# Patient Record
Sex: FEMALE | Race: WHITE | NOT HISPANIC OR LATINO | URBAN - METROPOLITAN AREA
[De-identification: names, ages, dates, MRNs, and addresses within clinical notes are randomized per-mention and may not be internally consistent; named-entity substitution may affect disease eponyms.]

---

## 2018-03-01 ENCOUNTER — HOSPITAL ENCOUNTER (OUTPATIENT)
Dept: OUTPATIENT SERVICES | Facility: HOSPITAL | Age: 58
Discharge: HOME | End: 2018-03-01
Attending: INTERNAL MEDICINE | Admitting: INTERNAL MEDICINE

## 2018-03-08 ENCOUNTER — OFFICE VISIT (OUTPATIENT)
Dept: NUTRITION | Facility: HOSPITAL | Age: 58
End: 2018-03-08
Attending: INTERNAL MEDICINE

## 2018-03-08 DIAGNOSIS — E66.01 MORBID OBESITY (CMS/HCC): ICD-10-CM

## 2018-03-08 PROCEDURE — 99000026 HC PROTEIN BAR BX

## 2018-03-08 PROCEDURE — 99000025 HC NEW DIRECTION BAR BX

## 2018-03-29 ENCOUNTER — OFFICE VISIT (OUTPATIENT)
Dept: NUTRITION | Facility: HOSPITAL | Age: 58
End: 2018-03-29
Attending: INTERNAL MEDICINE

## 2018-03-29 DIAGNOSIS — E66.01 MORBID OBESITY (CMS/HCC): ICD-10-CM

## 2018-03-29 PROCEDURE — 99000028 HC PUDDING BX

## 2018-03-29 PROCEDURE — 99000026 HC PROTEIN BAR BX

## 2018-03-29 PROCEDURE — 99000025 HC NEW DIRECTION BAR BX

## 2018-04-02 ENCOUNTER — OFFICE VISIT (OUTPATIENT)
Dept: FAMILY MEDICINE | Facility: CLINIC | Age: 58
End: 2018-04-02
Payer: COMMERCIAL

## 2018-04-02 VITALS
BODY MASS INDEX: 29.26 KG/M2 | HEART RATE: 79 BPM | SYSTOLIC BLOOD PRESSURE: 148 MMHG | OXYGEN SATURATION: 96 % | WEIGHT: 159 LBS | RESPIRATION RATE: 18 BRPM | DIASTOLIC BLOOD PRESSURE: 70 MMHG | HEIGHT: 62 IN

## 2018-04-02 DIAGNOSIS — M53.86 SCIATICA ASSOCIATED WITH DISORDER OF LUMBAR SPINE: ICD-10-CM

## 2018-04-02 DIAGNOSIS — M79.661 RIGHT CALF PAIN: Primary | ICD-10-CM

## 2018-04-02 PROBLEM — E53.8 DISORDER OF VITAMIN B12: Status: ACTIVE | Noted: 2017-08-30

## 2018-04-02 PROBLEM — R41.840 ATTENTION DISTURBANCE: Status: ACTIVE | Noted: 2017-03-30

## 2018-04-02 PROBLEM — E78.5 HYPERLIPIDEMIA: Status: ACTIVE | Noted: 2017-03-30

## 2018-04-02 PROBLEM — E66.9 OBESITY: Status: ACTIVE | Noted: 2017-03-30

## 2018-04-02 PROCEDURE — 99214 OFFICE O/P EST MOD 30 MIN: CPT | Performed by: INTERNAL MEDICINE

## 2018-04-02 RX ORDER — AZELASTINE HYDROCHLORIDE, FLUTICASONE PROPIONATE 137; 50 UG/1; UG/1
SPRAY, METERED NASAL
COMMUNITY
Start: 2017-09-18 | End: 2018-04-26 | Stop reason: ALTCHOICE

## 2018-04-02 RX ORDER — FLUOXETINE HYDROCHLORIDE 40 MG/1
80 CAPSULE ORAL DAILY
COMMUNITY
Start: 2017-03-30

## 2018-04-02 RX ORDER — METHYLPREDNISOLONE 4 MG
500 TABLET, DOSE PACK ORAL
COMMUNITY
Start: 2017-03-30 | End: 2020-12-01

## 2018-04-02 RX ORDER — EPINEPHRINE 0.3 MG/.3ML
INJECTION SUBCUTANEOUS
COMMUNITY
Start: 2017-04-24 | End: 2018-07-19 | Stop reason: SDUPTHER

## 2018-04-02 RX ORDER — GLUCOSAM/CHONDRO/HERB 149/HYAL 750-100 MG
TABLET ORAL
COMMUNITY
Start: 2017-03-30 | End: 2020-12-01

## 2018-04-02 RX ORDER — ERGOCALCIFEROL 1.25 MG/1
CAPSULE ORAL
COMMUNITY
Start: 2017-03-30 | End: 2020-12-01

## 2018-04-02 RX ORDER — LITHIUM CARBONATE 300 MG
300 TABLET ORAL NIGHTLY
COMMUNITY
Start: 2017-03-30

## 2018-04-02 RX ORDER — LISINOPRIL 10 MG/1
10 TABLET ORAL
COMMUNITY
Start: 2018-01-09 | End: 2018-07-19 | Stop reason: SDUPTHER

## 2018-04-02 RX ORDER — LISDEXAMFETAMINE DIMESYLATE 30 MG/1
30 CAPSULE ORAL
COMMUNITY
Start: 2014-02-18 | End: 2018-07-19 | Stop reason: DRUGHIGH

## 2018-04-03 ASSESSMENT — ENCOUNTER SYMPTOMS
ARTHRALGIAS: 1
PALPITATIONS: 0

## 2018-04-03 NOTE — PROGRESS NOTES
"  Subjective     Patient ID: Diana Walls is a 57 y.o. female with moderate to severe right sided calf pain for several days   HPI    Review of Systems   Cardiovascular: Negative for chest pain, palpitations and leg swelling.   Musculoskeletal: Positive for arthralgias.       Objective     Vitals:    04/02/18 1607   BP: (!) 148/70   Patient Position: Sitting   Pulse: 79   Resp: 18   SpO2: 96%   Weight: 72.1 kg (159 lb)   Height: 1.575 m (5' 2\")     Body mass index is 29.08 kg/m².    Physical Exam   Musculoskeletal: Normal range of motion. She exhibits tenderness. She exhibits no edema.       Assessment/Plan   Problem List Items Addressed This Visit     None      Visit Diagnoses     Right calf pain    -  Primary    will send her for Doppler US due to concern about her recent travel and persistent leg pain     Relevant Orders    Ultrasound venous leg right    Sciatica associated with disorder of lumbar spine        she will be going for physical therapy             "

## 2018-04-05 ENCOUNTER — OFFICE VISIT (OUTPATIENT)
Dept: NUTRITION | Facility: HOSPITAL | Age: 58
End: 2018-04-05
Attending: INTERNAL MEDICINE

## 2018-04-05 DIAGNOSIS — E66.01 MORBID OBESITY (CMS/HCC): ICD-10-CM

## 2018-04-05 PROCEDURE — 99000026 HC PROTEIN BAR BX: Performed by: DIETITIAN, REGISTERED

## 2018-04-05 PROCEDURE — 99000019 HC COLD BEVERAGE BX: Performed by: DIETITIAN, REGISTERED

## 2018-04-05 PROCEDURE — 99000025 HC NEW DIRECTION BAR BX: Performed by: DIETITIAN, REGISTERED

## 2018-04-12 ENCOUNTER — OFFICE VISIT (OUTPATIENT)
Dept: NUTRITION | Facility: HOSPITAL | Age: 58
End: 2018-04-12
Attending: INTERNAL MEDICINE

## 2018-04-12 DIAGNOSIS — E66.01 MORBID OBESITY (CMS/HCC): ICD-10-CM

## 2018-04-12 PROCEDURE — 99000019 HC COLD BEVERAGE BX

## 2018-04-12 PROCEDURE — 99000025 HC NEW DIRECTION BAR BX

## 2018-04-12 PROCEDURE — 99000026 HC PROTEIN BAR BX

## 2018-04-13 RX ORDER — FLUTICASONE PROPIONATE AND SALMETEROL 250; 50 UG/1; UG/1
1 POWDER RESPIRATORY (INHALATION) 2 TIMES DAILY
Qty: 1 EACH | Refills: 2 | Status: SHIPPED | OUTPATIENT
Start: 2018-04-13 | End: 2018-09-27 | Stop reason: SDUPTHER

## 2018-04-19 ENCOUNTER — OFFICE VISIT (OUTPATIENT)
Dept: NUTRITION | Facility: HOSPITAL | Age: 58
End: 2018-04-19
Attending: INTERNAL MEDICINE

## 2018-04-19 DIAGNOSIS — E66.01 MORBID OBESITY (CMS/HCC): ICD-10-CM

## 2018-04-19 PROCEDURE — 99000026 HC PROTEIN BAR BX: Performed by: DIETITIAN, REGISTERED

## 2018-04-19 PROCEDURE — 99000019 HC COLD BEVERAGE BX: Performed by: DIETITIAN, REGISTERED

## 2018-04-19 PROCEDURE — 99000025 HC NEW DIRECTION BAR BX: Performed by: DIETITIAN, REGISTERED

## 2018-04-26 ENCOUNTER — HOSPITAL ENCOUNTER (EMERGENCY)
Facility: HOSPITAL | Age: 58
Discharge: HOME | End: 2018-04-26
Attending: EMERGENCY MEDICINE
Payer: COMMERCIAL

## 2018-04-26 ENCOUNTER — APPOINTMENT (EMERGENCY)
Dept: RADIOLOGY | Facility: HOSPITAL | Age: 58
End: 2018-04-26
Attending: EMERGENCY MEDICINE
Payer: COMMERCIAL

## 2018-04-26 VITALS
BODY MASS INDEX: 30.49 KG/M2 | WEIGHT: 183 LBS | RESPIRATION RATE: 16 BRPM | SYSTOLIC BLOOD PRESSURE: 127 MMHG | HEIGHT: 65 IN | HEART RATE: 70 BPM | OXYGEN SATURATION: 95 % | TEMPERATURE: 99.1 F | DIASTOLIC BLOOD PRESSURE: 62 MMHG

## 2018-04-26 DIAGNOSIS — K59.00 CONSTIPATION, UNSPECIFIED CONSTIPATION TYPE: ICD-10-CM

## 2018-04-26 DIAGNOSIS — R10.9 ABDOMINAL PAIN, UNSPECIFIED ABDOMINAL LOCATION: Primary | ICD-10-CM

## 2018-04-26 DIAGNOSIS — M48.54XA COMPRESSION FRACTURE OF THORACIC VERTEBRA, NON-TRAUMATIC, INITIAL ENCOUNTER (CMS/HCC): ICD-10-CM

## 2018-04-26 LAB
ALBUMIN SERPL-MCNC: 4.1 G/DL (ref 3.4–5)
ALP SERPL-CCNC: 74 IU/L (ref 35–126)
ALT SERPL-CCNC: 21 IU/L (ref 11–54)
ANION GAP SERPL CALC-SCNC: 10 MEQ/L (ref 3–15)
AST SERPL-CCNC: 21 IU/L (ref 15–41)
BASOPHILS # BLD: 0.03 K/UL (ref 0.01–0.1)
BASOPHILS NFR BLD: 0.3 %
BILIRUB SERPL-MCNC: 0.7 MG/DL (ref 0.3–1.2)
BUN SERPL-MCNC: 25 MG/DL (ref 8–20)
CALCIUM SERPL-MCNC: 9.4 MG/DL (ref 8.9–10.3)
CHLORIDE SERPL-SCNC: 100 MMOL/L (ref 98–109)
CO2 SERPL-SCNC: 25 MMOL/L (ref 22–32)
CREAT SERPL-MCNC: 0.6 MG/DL (ref 0.6–1.1)
D DIMER PPP IA.FEU-MCNC: 0.35 ÆG/ML (ref 0–0.5)
DIFFERENTIAL METHOD BLD: ABNORMAL
EOSINOPHIL # BLD: 0.06 K/UL (ref 0.04–0.36)
EOSINOPHIL NFR BLD: 0.7 %
ERYTHROCYTE [DISTWIDTH] IN BLOOD BY AUTOMATED COUNT: 13.1 % (ref 11.7–14.4)
GFR SERPL CREATININE-BSD FRML MDRD: >60 ML/MIN/1.73M*2
GLUCOSE SERPL-MCNC: 104 MG/DL (ref 70–99)
HCT VFR BLDCO AUTO: 40.4 % (ref 35–45)
HGB BLD-MCNC: 13.4 G/DL (ref 11.8–15.7)
IMM GRANULOCYTES # BLD AUTO: 0.02 K/UL (ref 0–0.08)
IMM GRANULOCYTES NFR BLD AUTO: 0.2 %
LIPASE SERPL-CCNC: 33 U/L (ref 20–51)
LITHIUM SERPL-SCNC: 0.2 MMOL/L (ref 0.5–1)
LYMPHOCYTES # BLD: 1.03 K/UL (ref 1.2–3.5)
LYMPHOCYTES NFR BLD: 11.5 %
MCH RBC QN AUTO: 29.7 PG (ref 28–33.2)
MCHC RBC AUTO-ENTMCNC: 33.2 G/DL (ref 32.2–35.5)
MCV RBC AUTO: 89.6 FL (ref 83–98)
MONOCYTES # BLD: 0.58 K/UL (ref 0.28–0.8)
MONOCYTES NFR BLD: 6.5 %
NEUTROPHILS # BLD: 7.21 K/UL (ref 1.7–7)
NEUTS SEG NFR BLD: 80.8 %
NRBC BLD-RTO: 0 %
PDW BLD AUTO: 11.2 FL (ref 9.4–12.3)
PLATELET # BLD AUTO: 250 K/UL (ref 150–369)
POTASSIUM SERPL-SCNC: 3.8 MMOL/L (ref 3.6–5.1)
PROT SERPL-MCNC: 7.9 G/DL (ref 6–8.2)
RBC # BLD AUTO: 4.51 M/UL (ref 3.93–5.22)
SODIUM SERPL-SCNC: 135 MMOL/L (ref 136–144)
TROPONIN I SERPL-MCNC: <0.02 NG/ML
WBC # BLD AUTO: 8.93 K/UL (ref 3.8–10.5)

## 2018-04-26 PROCEDURE — 63600000 HC DRUGS/DETAIL CODE: Performed by: EMERGENCY MEDICINE

## 2018-04-26 PROCEDURE — 96374 THER/PROPH/DIAG INJ IV PUSH: CPT

## 2018-04-26 PROCEDURE — 80178 ASSAY OF LITHIUM: CPT | Performed by: EMERGENCY MEDICINE

## 2018-04-26 PROCEDURE — 96361 HYDRATE IV INFUSION ADD-ON: CPT

## 2018-04-26 PROCEDURE — 76775 US EXAM ABDO BACK WALL LIM: CPT

## 2018-04-26 PROCEDURE — 36415 COLL VENOUS BLD VENIPUNCTURE: CPT | Performed by: EMERGENCY MEDICINE

## 2018-04-26 PROCEDURE — 93971 EXTREMITY STUDY: CPT | Mod: RT

## 2018-04-26 PROCEDURE — 93005 ELECTROCARDIOGRAM TRACING: CPT | Performed by: EMERGENCY MEDICINE

## 2018-04-26 PROCEDURE — 85379 FIBRIN DEGRADATION QUANT: CPT | Performed by: EMERGENCY MEDICINE

## 2018-04-26 PROCEDURE — 25800000 HC PHARMACY IV SOLUTIONS: Performed by: EMERGENCY MEDICINE

## 2018-04-26 PROCEDURE — 74176 CT ABD & PELVIS W/O CONTRAST: CPT

## 2018-04-26 PROCEDURE — 80053 COMPREHEN METABOLIC PANEL: CPT | Performed by: EMERGENCY MEDICINE

## 2018-04-26 PROCEDURE — 83690 ASSAY OF LIPASE: CPT | Performed by: EMERGENCY MEDICINE

## 2018-04-26 PROCEDURE — 71045 X-RAY EXAM CHEST 1 VIEW: CPT

## 2018-04-26 PROCEDURE — 63700000 HC SELF-ADMINISTRABLE DRUG: Performed by: EMERGENCY MEDICINE

## 2018-04-26 PROCEDURE — 84484 ASSAY OF TROPONIN QUANT: CPT | Performed by: EMERGENCY MEDICINE

## 2018-04-26 PROCEDURE — 3E033NZ INTRODUCTION OF ANALGESICS, HYPNOTICS, SEDATIVES INTO PERIPHERAL VEIN, PERCUTANEOUS APPROACH: ICD-10-PCS | Performed by: EMERGENCY MEDICINE

## 2018-04-26 PROCEDURE — 3E0337Z INTRODUCTION OF ELECTROLYTIC AND WATER BALANCE SUBSTANCE INTO PERIPHERAL VEIN, PERCUTANEOUS APPROACH: ICD-10-PCS | Performed by: EMERGENCY MEDICINE

## 2018-04-26 PROCEDURE — 85025 COMPLETE CBC W/AUTO DIFF WBC: CPT | Performed by: EMERGENCY MEDICINE

## 2018-04-26 PROCEDURE — 76705 ECHO EXAM OF ABDOMEN: CPT

## 2018-04-26 PROCEDURE — 99285 EMERGENCY DEPT VISIT HI MDM: CPT | Mod: 25

## 2018-04-26 RX ORDER — HYDROMORPHONE HYDROCHLORIDE 1 MG/ML
0.5 INJECTION, SOLUTION INTRAMUSCULAR; INTRAVENOUS; SUBCUTANEOUS ONCE
Status: COMPLETED | OUTPATIENT
Start: 2018-04-26 | End: 2018-04-26

## 2018-04-26 RX ORDER — ACETAMINOPHEN 325 MG/1
650 TABLET ORAL ONCE
Status: COMPLETED | OUTPATIENT
Start: 2018-04-26 | End: 2018-04-26

## 2018-04-26 RX ADMIN — SODIUM CHLORIDE 500 ML: 9 INJECTION, SOLUTION INTRAVENOUS at 17:49

## 2018-04-26 RX ADMIN — HYDROMORPHONE HYDROCHLORIDE 0.5 MG: 1 INJECTION, SOLUTION INTRAMUSCULAR; INTRAVENOUS; SUBCUTANEOUS at 19:09

## 2018-04-26 RX ADMIN — ACETAMINOPHEN 650 MG: 325 TABLET, FILM COATED ORAL at 18:06

## 2018-04-26 ASSESSMENT — ENCOUNTER SYMPTOMS
ABDOMINAL PAIN: 1
FLANK PAIN: 1
BLOOD IN STOOL: 0
SHORTNESS OF BREATH: 0
NAUSEA: 0
VOMITING: 0
DYSURIA: 0
FREQUENCY: 0
HEMATURIA: 0

## 2018-04-26 NOTE — ED PROVIDER NOTES
"HPI     Chief Complaint   Patient presents with   • Abdominal Pain       57 female with several episodes of post-prandial rt upper abd pain with radiation to rt flank without nausea nor vomiting during the past week.  Denies any urinary sx, diarrhea, melena, or bloody stools.  Has had hx of rt sided sciatica but has noticed worsening rt calf pain since travelling to [place].        Abdominal Pain   Associated symptoms: no chest pain, no dysuria, no hematuria, no nausea, no shortness of breath and no vomiting         Patient History     Past Medical History:   Diagnosis Date   • Depression    • HTN (hypertension) asthma       History reviewed. No pertinent surgical history.    History reviewed. No pertinent family history.    Social History   Substance Use Topics   • Smoking status: Never Smoker   • Smokeless tobacco: Never Used   • Alcohol use No       Systems Reviewed from Nursing Triage:          Review of Systems     Review of Systems   Respiratory: Negative for shortness of breath.    Cardiovascular: Negative for chest pain.   Gastrointestinal: Positive for abdominal pain. Negative for blood in stool, nausea and vomiting.   Genitourinary: Positive for flank pain. Negative for dysuria, frequency and hematuria.   Musculoskeletal:        Right posterior leg pain   All other systems reviewed and are negative.       Physical Exam     ED Triage Vitals [04/26/18 1535]   Temp Heart Rate Resp BP SpO2   37.3 °C (99.1 °F) 92 18 (!) 161/77 98 %      Temp Source Heart Rate Source Patient Position BP Location FiO2 (%) (Set)   Tympanic -- Sitting Left upper arm --                     Patient Vitals for the past 24 hrs:   BP Temp Temp src Pulse Resp SpO2 Height Weight   04/26/18 1535 (!) 161/77 37.3 °C (99.1 °F) Tympanic 92 18 98 % 1.651 m (5' 5\") 83 kg (183 lb)           Physical Exam   Constitutional: She is oriented to person, place, and time. She appears well-developed and well-nourished.   HENT:   Head: Atraumatic. "   Mouth/Throat: Oropharynx is clear and moist.   Eyes: Conjunctivae and EOM are normal. Pupils are equal, round, and reactive to light.   Neck: Neck supple.   Cardiovascular: Normal rate, regular rhythm, normal heart sounds and intact distal pulses.    Pulmonary/Chest: Effort normal and breath sounds normal.   Abdominal: Soft. She exhibits no distension and no mass. There is tenderness (mild ruq without hepatomegaly nor guarding). There is no rebound and no guarding.   Musculoskeletal: Normal range of motion. She exhibits tenderness (mild posterior rt calf). She exhibits no edema.   Neurological: She is alert and oriented to person, place, and time.   Skin: Skin is warm and dry.   Psychiatric: She has a normal mood and affect. Her behavior is normal.   Nursing note and vitals reviewed.           Procedures    ED Course & MDM     Labs Reviewed   CBC AND DIFFERENTIAL    Narrative:     The following orders were created for panel order CBC and Differential.  Procedure                               Abnormality         Status                     ---------                               -----------         ------                     CBC[[id number]]                                                                          Diff Count[[id number]]                                                                     Please view results for these tests on the individual orders.   COMPREHENSIVE METABOLIC PANEL   LIPASE   TROPONIN I   URINALYSIS REFLEX CULTURE    Narrative:     The following orders were created for panel order Urinalysis with Reflex Culture.  Procedure                               Abnormality         Status                     ---------                               -----------         ------                     UA Reflex to Culture (Mac...[[id number]]                                                   Please view results for these tests on the individual orders.   D-DIMER   CBC   DIFF COUNT   UA REFLEX CULTURE  (MACROSCOPIC)       ECG 12 lead    (Results Pending)   X-RAY CHEST 1 VIEW    (Results Pending)   ULTRASOUND ABDOMEN LIMITED    (Results Pending)   Ultrasound venous leg right    (Results Pending)   ULTRASOUND KIDNEYS    (Results Pending)           [facility]         ED Course as of Apr 26 2034   Thu Apr 26, 2018   1603 Will provide ivf, monitoing, small dose of analgesics, obtain cxr, US  [HL]   1707 IMPRESSION: No acute cardiopulmonary disease.  No free air underneath the  hemidiaphragms. X-RAY CHEST 1 VIEW [HL]   1729 IMPRESSION:    1.  No evidence for cholelithiasis, acute cholecystitis,  or biliary ductal  dilatation.  2.  Mild left hydronephrosis.  3.  No right hydronephrosis.      I certify that I have reviewed this examination and agree with this report.  TRACEY Alvarado Jr.O. ULTRASOUND ABDOMEN LIMITED [HL]   1730 IMPRESSION:  No evidence for right - sided deep venous thrombosis.    I certify that I have reviewed this examination and agree with this report.  TRACEY Alvarado Jr.O. Ultrasound venous leg right [HL]   1923 IMPRESSION:  1. No obstructive uropathy. No hydronephrosis. No renal, ureteral, or urinary  bladder calculi.  2. Significant amount of stool throughout the colon.  Clinically correlate for  constipation.  3. Age-indeterminate T12 wedge compression deformity.  4. Additional findings as described above. CT ABDOMEN PELVIS WITHOUT IV CONTRAST [HL]   1944 Results are related to patient.  Patient is not able to give a urine sample.  Her symptoms consistent with urinary tract infection.  Advised patient to follow-up with PCP Dr. Kitchen.  [HL]      ED Course User Index  [HL] Seamus Amanda MD         Clinical Impressions as of Apr 26 2034   Abdominal pain, unspecified abdominal location   Constipation, unspecified constipation type   Compression fracture of thoracic vertebra, non-traumatic, initial encounter (CMS/[facility]) ([facility])     Disposition:       Seamus Amanda MD  04/26/18 2034

## 2018-04-27 LAB
ATRIAL RATE: 87
P AXIS: 49
PR INTERVAL: 164
QRS DURATION: 86
QT INTERVAL: 358
QTC CALCULATION(BAZETT): 430
R AXIS: 61
T WAVE AXIS: 60
VENTRICULAR RATE: 87

## 2018-05-03 ENCOUNTER — OFFICE VISIT (OUTPATIENT)
Dept: NUTRITION | Facility: HOSPITAL | Age: 58
End: 2018-05-03
Attending: INTERNAL MEDICINE

## 2018-05-03 DIAGNOSIS — E66.01 MORBID OBESITY (CMS/HCC): ICD-10-CM

## 2018-05-03 PROCEDURE — 99000019 HC COLD BEVERAGE BX

## 2018-05-03 PROCEDURE — 99000025 HC NEW DIRECTION BAR BX

## 2018-05-03 PROCEDURE — 99000028 HC PUDDING BX

## 2018-05-04 ENCOUNTER — TRANSCRIBE ORDERS (OUTPATIENT)
Dept: SCHEDULING | Age: 58
End: 2018-05-04

## 2018-05-04 DIAGNOSIS — R10.84 ABDOMINAL PAIN, GENERALIZED: Primary | ICD-10-CM

## 2018-05-10 ENCOUNTER — OFFICE VISIT (OUTPATIENT)
Dept: NUTRITION | Facility: HOSPITAL | Age: 58
End: 2018-05-10
Attending: INTERNAL MEDICINE

## 2018-05-10 DIAGNOSIS — E66.01 MORBID OBESITY (CMS/HCC): ICD-10-CM

## 2018-05-10 PROCEDURE — 99000026 HC PROTEIN BAR BX: Performed by: DIETITIAN, REGISTERED

## 2018-05-10 PROCEDURE — 99000025 HC NEW DIRECTION BAR BX: Performed by: DIETITIAN, REGISTERED

## 2018-05-24 ENCOUNTER — OFFICE VISIT (OUTPATIENT)
Dept: NUTRITION | Facility: HOSPITAL | Age: 58
End: 2018-05-24
Attending: INTERNAL MEDICINE

## 2018-05-24 DIAGNOSIS — E66.01 MORBID OBESITY (CMS/HCC): ICD-10-CM

## 2018-05-24 PROCEDURE — 99000028 HC PUDDING BX

## 2018-05-24 PROCEDURE — 99000025 HC NEW DIRECTION BAR BX

## 2018-05-24 PROCEDURE — 99000026 HC PROTEIN BAR BX

## 2018-05-31 ENCOUNTER — OFFICE VISIT (OUTPATIENT)
Dept: NUTRITION | Facility: HOSPITAL | Age: 58
End: 2018-05-31
Attending: INTERNAL MEDICINE

## 2018-05-31 DIAGNOSIS — E66.01 MORBID OBESITY (CMS/HCC): ICD-10-CM

## 2018-05-31 PROCEDURE — 99000028 HC PUDDING BX: Performed by: DIETITIAN, REGISTERED

## 2018-05-31 PROCEDURE — 99000026 HC PROTEIN BAR BX: Performed by: DIETITIAN, REGISTERED

## 2018-06-07 ENCOUNTER — OFFICE VISIT (OUTPATIENT)
Dept: NUTRITION | Facility: HOSPITAL | Age: 58
End: 2018-06-07
Attending: INTERNAL MEDICINE

## 2018-06-07 DIAGNOSIS — E66.01 MORBID OBESITY (CMS/HCC): ICD-10-CM

## 2018-06-07 PROCEDURE — 99000026 HC PROTEIN BAR BX

## 2018-06-07 PROCEDURE — 99000025 HC NEW DIRECTION BAR BX

## 2018-06-21 ENCOUNTER — OFFICE VISIT (OUTPATIENT)
Dept: NUTRITION | Facility: HOSPITAL | Age: 58
End: 2018-06-21
Attending: INTERNAL MEDICINE

## 2018-06-21 DIAGNOSIS — E66.01 MORBID OBESITY (CMS/HCC): ICD-10-CM

## 2018-06-21 PROCEDURE — 99000028 HC PUDDING BX

## 2018-06-21 PROCEDURE — 99000025 HC NEW DIRECTION BAR BX

## 2018-06-21 PROCEDURE — 99000026 HC PROTEIN BAR BX

## 2018-06-29 ENCOUNTER — TELEPHONE (OUTPATIENT)
Dept: FAMILY MEDICINE | Facility: CLINIC | Age: 58
End: 2018-06-29

## 2018-06-29 ENCOUNTER — TRANSCRIBE ORDERS (OUTPATIENT)
Dept: SCHEDULING | Age: 58
End: 2018-06-29

## 2018-06-29 DIAGNOSIS — Z12.39 BREAST CANCER SCREENING: ICD-10-CM

## 2018-06-29 DIAGNOSIS — Z00.00 ANNUAL PHYSICAL EXAM: Primary | ICD-10-CM

## 2018-06-29 DIAGNOSIS — Z12.31 ENCOUNTER FOR SCREENING MAMMOGRAM FOR MALIGNANT NEOPLASM OF BREAST: Primary | ICD-10-CM

## 2018-06-29 NOTE — TELEPHONE ENCOUNTER
2 THINGS    Patient is requesting a new script for an annual mammo screening(i was going to print it from when it was ordered in next gen but the patient is requesting a new electronic one.)    Also, She is scheduled July 9th and would like Labs ordered to [facility].    Please let me know once complete and I will inform the patient,

## 2018-07-05 ENCOUNTER — OFFICE VISIT (OUTPATIENT)
Dept: NUTRITION | Facility: HOSPITAL | Age: 58
End: 2018-07-05
Attending: INTERNAL MEDICINE

## 2018-07-05 DIAGNOSIS — E66.01 MORBID OBESITY (CMS/HCC): ICD-10-CM

## 2018-07-05 PROCEDURE — 99000028 HC PUDDING BX

## 2018-07-05 PROCEDURE — 99000026 HC PROTEIN BAR BX

## 2018-07-05 PROCEDURE — 99000025 HC NEW DIRECTION BAR BX

## 2018-07-09 ENCOUNTER — HOSPITAL ENCOUNTER (OUTPATIENT)
Dept: RADIOLOGY | Age: 58
Discharge: HOME | End: 2018-07-09
Attending: INTERNAL MEDICINE
Payer: COMMERCIAL

## 2018-07-09 DIAGNOSIS — Z12.31 ENCOUNTER FOR SCREENING MAMMOGRAM FOR MALIGNANT NEOPLASM OF BREAST: ICD-10-CM

## 2018-07-09 PROCEDURE — 77063 BREAST TOMOSYNTHESIS BI: CPT

## 2018-07-19 ENCOUNTER — OFFICE VISIT (OUTPATIENT)
Dept: FAMILY MEDICINE | Facility: CLINIC | Age: 58
End: 2018-07-19
Payer: COMMERCIAL

## 2018-07-19 ENCOUNTER — OFFICE VISIT (OUTPATIENT)
Dept: NUTRITION | Facility: HOSPITAL | Age: 58
End: 2018-07-19
Attending: INTERNAL MEDICINE

## 2018-07-19 VITALS
WEIGHT: 184 LBS | BODY MASS INDEX: 31.41 KG/M2 | SYSTOLIC BLOOD PRESSURE: 136 MMHG | TEMPERATURE: 97.4 F | HEART RATE: 71 BPM | OXYGEN SATURATION: 98 % | HEIGHT: 64 IN | DIASTOLIC BLOOD PRESSURE: 78 MMHG

## 2018-07-19 DIAGNOSIS — Z00.00 ENCOUNTER FOR GENERAL ADULT MEDICAL EXAMINATION WITHOUT ABNORMAL FINDINGS: Primary | ICD-10-CM

## 2018-07-19 DIAGNOSIS — R41.840 ATTENTION DISTURBANCE: ICD-10-CM

## 2018-07-19 DIAGNOSIS — E53.8 DISORDER OF VITAMIN B12: ICD-10-CM

## 2018-07-19 DIAGNOSIS — E78.2 MIXED HYPERLIPIDEMIA: ICD-10-CM

## 2018-07-19 DIAGNOSIS — E66.01 MORBID OBESITY (CMS/HCC): ICD-10-CM

## 2018-07-19 DIAGNOSIS — Z01.419 ENCNTR FOR GYN EXAM (GENERAL) (ROUTINE) W/O ABN FINDINGS: ICD-10-CM

## 2018-07-19 DIAGNOSIS — I10 ESSENTIAL HYPERTENSION: ICD-10-CM

## 2018-07-19 DIAGNOSIS — R73.09 ELEVATED GLUCOSE: ICD-10-CM

## 2018-07-19 DIAGNOSIS — F32.A DEPRESSIVE DISORDER: ICD-10-CM

## 2018-07-19 PROBLEM — N95.2 ATROPHIC VAGINITIS: Status: ACTIVE | Noted: 2018-07-19

## 2018-07-19 PROCEDURE — 99396 PREV VISIT EST AGE 40-64: CPT | Mod: 25 | Performed by: INTERNAL MEDICINE

## 2018-07-19 PROCEDURE — 200200 PR NO CHARGE: Performed by: INTERNAL MEDICINE

## 2018-07-19 PROCEDURE — 99000025 HC NEW DIRECTION BAR BX

## 2018-07-19 PROCEDURE — 99000026 HC PROTEIN BAR BX

## 2018-07-19 PROCEDURE — 90632 HEPA VACCINE ADULT IM: CPT | Performed by: INTERNAL MEDICINE

## 2018-07-19 PROCEDURE — 99000028 HC PUDDING BX

## 2018-07-19 PROCEDURE — 90471 IMMUNIZATION ADMIN: CPT | Performed by: INTERNAL MEDICINE

## 2018-07-19 RX ORDER — LISINOPRIL 10 MG/1
10 TABLET ORAL DAILY
Qty: 90 TABLET | Refills: 3 | Status: SHIPPED | OUTPATIENT
Start: 2018-07-19 | End: 2018-12-20 | Stop reason: SDUPTHER

## 2018-07-19 RX ORDER — LISDEXAMFETAMINE DIMESYLATE 40 MG/1
40 CAPSULE ORAL EVERY MORNING
Refills: 0 | COMMUNITY
Start: 2018-05-25

## 2018-07-19 RX ORDER — EPINEPHRINE 0.3 MG/.3ML
1 INJECTION SUBCUTANEOUS ONCE
Qty: 1 EACH | Refills: 5 | Status: SHIPPED | OUTPATIENT
Start: 2018-07-19 | End: 2018-07-19

## 2018-07-19 RX ORDER — ESTRADIOL 0.1 MG/G
2 CREAM VAGINAL NIGHTLY
Qty: 42.5 G | Refills: 5 | Status: SHIPPED | OUTPATIENT
Start: 2018-07-19 | End: 2018-08-18

## 2018-07-19 ASSESSMENT — ENCOUNTER SYMPTOMS
NEUROLOGICAL NEGATIVE: 1
CONSTIPATION: 1
DYSPHORIC MOOD: 0
FLANK PAIN: 0
TROUBLE SWALLOWING: 0
DYSURIA: 0
RESPIRATORY NEGATIVE: 1
EYE REDNESS: 0
HEMATOLOGIC/LYMPHATIC NEGATIVE: 1
FREQUENCY: 0
VOMITING: 0
ABDOMINAL PAIN: 0
NAUSEA: 0
DIARRHEA: 0
CARDIOVASCULAR NEGATIVE: 1
CONSTITUTIONAL NEGATIVE: 1
DECREASED CONCENTRATION: 0
MUSCULOSKELETAL NEGATIVE: 1
SORE THROAT: 0
AGITATION: 0
SLEEP DISTURBANCE: 0
WOUND: 0

## 2018-07-19 ASSESSMENT — PAIN SCALES - GENERAL: PAINLEVEL: 0-NO PAIN

## 2018-07-19 NOTE — PROGRESS NOTES
"  Subjective     Patient ID: Diana Walls is a 57 y.o. female.    She is here for a physical   She's had GI testing for IBS symptoms- taking Miralax regularly         Review of Systems   Constitutional: Negative.    HENT: Negative for ear pain, nosebleeds, sore throat, tinnitus and trouble swallowing.    Eyes: Negative for redness and visual disturbance.   Respiratory: Negative.    Cardiovascular: Negative.    Gastrointestinal: Positive for constipation. Negative for abdominal pain, diarrhea, nausea and vomiting.   Endocrine: Negative for cold intolerance and heat intolerance.   Genitourinary: Negative for dysuria, flank pain, frequency and urgency.   Musculoskeletal: Negative.    Skin: Negative for rash and wound.   Allergic/Immunologic: Negative for environmental allergies and food allergies.   Neurological: Negative.    Hematological: Negative.    Psychiatric/Behavioral: Negative for agitation, decreased concentration, dysphoric mood and sleep disturbance.       Objective     Vitals:    07/19/18 0809 07/19/18 0859   BP: 132/80 136/78   BP Location: Left upper arm    Patient Position: Sitting    Pulse: 71    Temp: 36.3 °C (97.4 °F)    TempSrc: Oral    SpO2: 98%    Weight: 83.5 kg (184 lb)    Height: 1.619 m (5' 3.75\")      Body mass index is 31.83 kg/m².    Physical Exam   Constitutional: She is oriented to person, place, and time. She appears well-developed. No distress.   HENT:   Head: Normocephalic.   Nose: Nose normal.   Mouth/Throat: Oropharynx is clear and moist.   Eyes: Conjunctivae and EOM are normal. Pupils are equal, round, and reactive to light.   Neck: Normal range of motion. No JVD present. No thyromegaly present.   Cardiovascular: Normal rate, regular rhythm, normal heart sounds and intact distal pulses.    No murmur heard.  Pulmonary/Chest: Effort normal and breath sounds normal. No stridor. No respiratory distress. She has no wheezes. Right breast exhibits no inverted nipple, no mass, no nipple " discharge, no skin change and no tenderness. Left breast exhibits no inverted nipple, no mass, no nipple discharge, no skin change and no tenderness.   Abdominal: Soft. Bowel sounds are normal. She exhibits no distension and no mass. There is no tenderness. No hernia.   Genitourinary: Vagina normal and uterus normal. Rectal exam shows no external hemorrhoid and no fissure. Pelvic exam was performed with patient supine. There is no tenderness or lesion on the right labia. There is no tenderness or lesion on the left labia. Cervix exhibits no motion tenderness and no discharge. Right adnexum displays no mass, no tenderness and no fullness. Left adnexum displays no mass, no tenderness and no fullness. No vaginal discharge found.   Musculoskeletal: Normal range of motion. She exhibits no edema or deformity.   Lymphadenopathy:     She has no cervical adenopathy.   Neurological: She is alert and oriented to person, place, and time. No cranial nerve deficit. Coordination normal.   Skin: Skin is warm and dry. Capillary refill takes less than 2 seconds. No rash noted.   Psychiatric: She has a normal mood and affect. Her behavior is normal. Judgment and thought content normal.   Nursing note and vitals reviewed.      Assessment/Plan   Problem List Items Addressed This Visit     Depressive disorder    Relevant Orders    TSH 3rd Generation    Disorder of vitamin B12    Attention disturbance     Remains on Vyvanse          Essential hypertension     BP remains well controlled on current regimen          Relevant Medications    lisinopril (PRINIVIL) 10 mg tablet    Other Relevant Orders    ECG 12 LEAD OFFICE PERFORMED    TSH 3rd Generation    Hyperlipidemia    Relevant Orders    Lipid panel      Other Visit Diagnoses     Encounter for general adult medical examination without abnormal findings    -  Primary    Encntr for gyn exam (general) (routine) w/o abn findings        Relevant Orders    Cytology, Thinprep Pap    PAP AND HR  HPV W/REFLEX TO GENOTYPING 16,18/45    Elevated glucose        Relevant Orders    Hemoglobin A1c

## 2018-07-19 NOTE — PATIENT INSTRUCTIONS
1. We will contact you to come in to  for the Shingrix vaccine and complete the Hepatitis A vaccine in 6 months       Patient Education   Hepatitis A Vaccine: What You Need to Know  1. Why get vaccinated?  Hepatitis A is a serious liver disease. It is caused by the hepatitis A virus (HAV). HAV is spread from person to person through contact with the feces (stool) of people who are infected, which can easily happen if someone does not wash his or her hands properly. You can also get hepatitis A from food, water, or objects contaminated with HAV.  Symptoms of hepatitis A can include:  · fever, fatigue, loss of appetite, nausea, vomiting, and/or joint pain  · severe stomach pains and diarrhea (mainly in children), or  · jaundice (yellow skin or eyes, dark urine, branden-colored bowel movements).  These symptoms usually appear 2 to 6 weeks after exposure and usually last less than 2 months, although some people can be ill for as long as 6 months. If you have hepatitis A you may be too ill to work.  Children often do not have symptoms, but most adults do. You can spread HAV without having symptoms.  Hepatitis A can cause liver failure and death, although this is rare and occurs more commonly in persons 50 years of age or older and persons with other liver diseases, such as hepatitis B or C.  Hepatitis A vaccine can prevent hepatitis A. Hepatitis A vaccines were recommended in the [place] beginning in 1996. Since then, the number of cases reported each year in the [place] has dropped from around 31,000 cases to fewer than 1,500 cases.  2. Hepatitis A vaccine  Hepatitis A vaccine is an inactivated (killed) vaccine. You will need 2 doses for long-lasting protection. These doses should be given at least 6 months apart.  Children are routinely vaccinated between their first and second birthdays (12 through 23 months of age). Older children and adolescents can get the vaccine after 23 months. Adults who have not been  vaccinated previously and want to be protected against hepatitis A can also get the vaccine.  You should get hepatitis A vaccine if you:  · are traveling to countries where hepatitis A is common,  · are a man who has sex with other men,  · use illegal drugs,  · have a chronic liver disease such as hepatitis B or hepatitis C,  · are being treated with clotting-factor concentrates,  · work with hepatitis A-infected animals or in a hepatitis A research laboratory, or  · expect to have close personal contact with an international adoptee from a country where hepatitis A is common  Ask your healthcare provider if you want more information about any of these groups.  There are no known risks to getting hepatitis A vaccine at the same time as other vaccines.  3. Some people should not get this vaccine  Tell the person who is giving you the vaccine:  · If you have any severe, life-threatening allergies. If you ever had a life-threatening allergic reaction after a dose of hepatitis A vaccine, or have a severe allergy to any part of this vaccine, you may be advised not to get vaccinated. Ask your health care provider if you want information about vaccine components.  · If you are not feeling well. If you have a mild illness, such as a cold, you can probably get the vaccine today. If you are moderately or severely ill, you should probably wait until you recover. Your doctor can advise you.  4. Risks of a vaccine reaction  With any medicine, including vaccines, there is a chance of side effects. These are usually mild and go away on their own, but serious reactions are also possible.  Most people who get hepatitis A vaccine do not have any problems with it.  Minor problems following hepatitis A vaccine include:  · soreness or redness where the shot was given  · low-grade fever  · headache  · tiredness  If these problems occur, they usually begin soon after the shot and last 1 or 2 days.  Your doctor can tell you more about  these reactions.  Other problems that could happen after this vaccine:  · People sometimes faint after a medical procedure, including vaccination. Sitting or lying down for about 15 minutes can help prevent fainting, and injuries caused by a fall. Tell your provider if you feel dizzy, or have vision changes or ringing in the ears.  · Some people get shoulder pain that can be more severe and longer lasting than the more routine soreness that can follow injections. This happens very rarely.  · Any medication can cause a severe allergic reaction. Such reactions from a vaccine are very rare, estimated at about 1 in a million doses, and would happen within a few minutes to a few hours after the vaccination.  As with any medicine, there is a very remote chance of a vaccine causing a serious injury or death.  The safety of vaccines is always being monitored. For more information, visit: www.cdc.gov/vaccinesafety/  5. What if there is a serious problem?  What should I look for?  Look for anything that concerns you, such as signs of a severe allergic reaction, very high fever, or unusual behavior.  Signs of a severe allergic reaction can include hives, swelling of the face and throat, difficulty breathing, a fast heartbeat, dizziness, and weakness. These would start a few minutes to a few hours after the vaccination.  What should I do?  · If you think it is a severe allergic reaction or other emergency that can't wait, call 9-1-1 or get to the nearest hospital. Otherwise, call your clinic.  · Afterward, the reaction should be reported to the Vaccine Adverse Event Reporting System (VAERS). Your doctor should file this report, or you can do it yourself through the VAERS web site at www.vaers.hhs.gov, or by calling 1-382.787.2328.  ¨ VAERS does not give medical advice.  6. The National Vaccine Injury Compensation Program  The National Vaccine Injury Compensation Program (VICP) is a federal program that was created to compensate  people who may have been injured by certain vaccines.  Persons who believe they may have been injured by a vaccine can learn about the program and about filing a claim by calling 1-459.130.1180 or visiting the VIC website at www.[facility].gov/vaccinecompensation. There is a time limit to file a claim for compensation.  7. How can I learn more?  · Ask your healthcare provider. He or she can give you the vaccine package insert or suggest other sources of information.  · Call your local or state health department.  · Contact the Centers for Disease Control and Prevention (CDC):  ¨ Call 1-151.406.8948 (2-209-ELH-INFO) or  ¨ Visit CDC's website at www.cdc.gov/vaccines  CDC Hepatitis A Vaccine VIS (7/20/2016)  This information is not intended to replace advice given to you by your health care provider. Make sure you discuss any questions you have with your health care provider.  Document Released: 10/12/2007 Document Revised: 09/07/2017 Document Reviewed: 09/07/2017  Elsevier Interactive Patient Education © 2017 [employer] Inc.

## 2018-07-26 LAB
CYTOLOGIST CVX/VAG CYTO: NORMAL
CYTOLOGY CVX/VAG DOC THIN PREP: NORMAL
DX ICD CODE: NORMAL
HPV I/H RISK 4 DNA CVX QL PROBE+SIG AMP: NEGATIVE
LAB CORP NOTE:: NORMAL
OTHER STN SPEC: NORMAL
PATH REPORT.FINAL DX SPEC: NORMAL
STAT OF ADQ CVX/VAG CYTO-IMP: NORMAL

## 2018-08-01 ENCOUNTER — APPOINTMENT (OUTPATIENT)
Dept: LAB | Facility: HOSPITAL | Age: 58
End: 2018-08-01
Attending: INTERNAL MEDICINE
Payer: COMMERCIAL

## 2018-08-01 DIAGNOSIS — E78.2 MIXED HYPERLIPIDEMIA: ICD-10-CM

## 2018-08-01 DIAGNOSIS — R73.09 ELEVATED GLUCOSE: ICD-10-CM

## 2018-08-01 DIAGNOSIS — F32.A DEPRESSIVE DISORDER: ICD-10-CM

## 2018-08-01 DIAGNOSIS — I10 ESSENTIAL HYPERTENSION: ICD-10-CM

## 2018-08-01 LAB
CHOLEST SERPL-MCNC: 268 MG/DL
EST. AVERAGE GLUCOSE BLD GHB EST-MCNC: 103 MG/DL
HBA1C MFR BLD HPLC: 5.2 %
HDLC SERPL-MCNC: 88 MG/DL
HDLC SERPL: 3 {RATIO}
LDLC SERPL CALC-MCNC: 169 MG/DL
NONHDLC SERPL-MCNC: 180 MG/DL
TRIGL SERPL-MCNC: 54 MG/DL (ref 30–149)
TSH SERPL DL<=0.05 MIU/L-ACNC: 1.08 MIU/ML (ref 0.34–5.6)

## 2018-08-01 PROCEDURE — 84443 ASSAY THYROID STIM HORMONE: CPT

## 2018-08-01 PROCEDURE — 80061 LIPID PANEL: CPT

## 2018-08-01 PROCEDURE — 36415 COLL VENOUS BLD VENIPUNCTURE: CPT

## 2018-08-01 PROCEDURE — 83036 HEMOGLOBIN GLYCOSYLATED A1C: CPT

## 2018-08-21 ENCOUNTER — OFFICE VISIT (OUTPATIENT)
Dept: NUTRITION | Facility: HOSPITAL | Age: 58
End: 2018-08-21
Attending: INTERNAL MEDICINE

## 2018-08-21 DIAGNOSIS — E66.01 MORBID OBESITY (CMS/HCC): ICD-10-CM

## 2018-08-21 PROCEDURE — 99000026 HC PROTEIN BAR BX

## 2018-08-21 PROCEDURE — 99000028 HC PUDDING BX

## 2018-08-21 PROCEDURE — 99000025 HC NEW DIRECTION BAR BX

## 2018-09-17 ENCOUNTER — TELEPHONE (OUTPATIENT)
Dept: FAMILY MEDICINE | Facility: CLINIC | Age: 58
End: 2018-09-17

## 2018-09-17 DIAGNOSIS — S00.93XA CONTUSION OF HEAD, UNSPECIFIED PART OF HEAD, INITIAL ENCOUNTER: Primary | ICD-10-CM

## 2018-09-17 DIAGNOSIS — S06.0X9A CONCUSSION WITH LOSS OF CONSCIOUSNESS, INITIAL ENCOUNTER: ICD-10-CM

## 2018-09-17 NOTE — TELEPHONE ENCOUNTER
Pt called screaming on the phone, stated she left several messages earlier this morning on the vm line.  I apologized to Pt and explained that the  was helping patients and answering phones to the best of their ability.    Please see email sent this morning via , she needs a clinical referral for PT @ [facility] ph:  [phone].  Pt hung up the phone on our call.

## 2018-09-21 ENCOUNTER — TELEPHONE (OUTPATIENT)
Dept: FAMILY MEDICINE | Facility: CLINIC | Age: 58
End: 2018-09-21

## 2018-09-21 DIAGNOSIS — F07.81 CONCUSSION SYNDROME: Primary | ICD-10-CM

## 2018-09-21 NOTE — TELEPHONE ENCOUNTER
PT is calling to ask for Physical Therapy script for patient to be faxed to [phone]. It should state Eval & Treat for concussion syndrome. If there are any further questions PT can be reached at [phone]

## 2018-09-25 ENCOUNTER — TELEPHONE (OUTPATIENT)
Dept: FAMILY MEDICINE | Facility: CLINIC | Age: 58
End: 2018-09-25

## 2018-09-25 NOTE — TELEPHONE ENCOUNTER
I received a call from [facility] Physical Therapy.    They need a written and signed script faxed to them.    It needs to be backdated to 9/21, state evaluate and treat and the dx code F07.81.    Please fax to [phone]

## 2018-09-27 RX ORDER — FLUTICASONE PROPIONATE AND SALMETEROL 250; 50 UG/1; UG/1
1 POWDER RESPIRATORY (INHALATION) 2 TIMES DAILY
Qty: 180 EACH | Refills: 3 | Status: SHIPPED | OUTPATIENT
Start: 2018-09-27 | End: 2019-10-18

## 2018-10-16 ENCOUNTER — OFFICE VISIT (OUTPATIENT)
Dept: NUTRITION | Facility: HOSPITAL | Age: 58
End: 2018-10-16
Attending: INTERNAL MEDICINE

## 2018-10-16 DIAGNOSIS — E66.01 MORBID OBESITY (CMS/HCC): ICD-10-CM

## 2018-10-16 PROCEDURE — 99000026 HC PROTEIN BAR BX

## 2018-10-16 PROCEDURE — 99000025 HC NEW DIRECTION BAR BX

## 2018-10-16 PROCEDURE — 99000028 HC PUDDING BX

## 2018-10-22 ENCOUNTER — OFFICE VISIT (OUTPATIENT)
Dept: NUTRITION | Facility: HOSPITAL | Age: 58
End: 2018-10-22
Attending: INTERNAL MEDICINE

## 2018-10-22 VITALS — WEIGHT: 191 LBS | BODY MASS INDEX: 31.82 KG/M2 | HEIGHT: 65 IN

## 2018-10-22 DIAGNOSIS — E66.01 MORBID OBESITY (CMS/HCC): ICD-10-CM

## 2018-10-22 PROCEDURE — 99000026 HC PROTEIN BAR BX

## 2018-10-22 PROCEDURE — 99000025 HC NEW DIRECTION BAR BX

## 2018-10-22 PROCEDURE — 97802 MEDICAL NUTRITION INDIV IN: CPT

## 2018-10-22 PROCEDURE — 99000028 HC PUDDING BX

## 2018-10-22 NOTE — PROGRESS NOTES
New Direction Member  Yesy was seen today to resume her New Direction meal plan and decide goals. She would like to continue to strive for a goal weight at 175lbs and then maintain that weight.  She plans to attend every 2 weeks for now, due to recent move to a more distant location from [place].    Stefani Parekh RDN LDN CDE

## 2018-10-22 NOTE — LETTER
October 22, 2018     Anju Kitchen MD  [address]  [place] [address]    Patient: Diana Walls   YOB: 1960   Date of Visit: 10/22/2018       Dear Dr. Kitchen:    Thank you for referring Diana Walls to me for evaluation. Below are my notes for this consultation.    If you have questions, please do not hesitate to call me. I look forward to following your patient along with you.         Sincerely,        Stefani Parekh        CC: No Recipients    New Direction Member  Yesy was seen today to resume her New Direction meal plan and decide goals. She would like to continue to strive for a goal weight at 175lbs and then maintain that weight.  She plans to attend every 2 weeks for now, due to recent move to a more distant location from [place].    Stefani Parekh RDN LDN CDE

## 2018-11-08 ENCOUNTER — OFFICE VISIT (OUTPATIENT)
Dept: NUTRITION | Facility: HOSPITAL | Age: 58
End: 2018-11-08
Attending: INTERNAL MEDICINE

## 2018-11-08 DIAGNOSIS — E66.01 MORBID OBESITY (CMS/HCC): ICD-10-CM

## 2018-11-08 PROCEDURE — 99000025 HC NEW DIRECTION BAR BX: Performed by: DIETITIAN, REGISTERED

## 2018-11-08 PROCEDURE — 99000028 HC PUDDING BX: Performed by: DIETITIAN, REGISTERED

## 2018-11-08 PROCEDURE — 99000026 HC PROTEIN BAR BX: Performed by: DIETITIAN, REGISTERED

## 2018-12-06 ENCOUNTER — OFFICE VISIT (OUTPATIENT)
Dept: NUTRITION | Facility: HOSPITAL | Age: 58
End: 2018-12-06
Attending: INTERNAL MEDICINE

## 2018-12-06 DIAGNOSIS — E66.01 MORBID OBESITY (CMS/HCC): ICD-10-CM

## 2018-12-06 PROCEDURE — 99000025 HC NEW DIRECTION BAR BX: Performed by: DIETITIAN, REGISTERED

## 2018-12-06 PROCEDURE — 99000028 HC PUDDING BX: Performed by: DIETITIAN, REGISTERED

## 2018-12-06 PROCEDURE — 99000022 HC GROUP EDUCATION: Performed by: DIETITIAN, REGISTERED

## 2018-12-06 PROCEDURE — 99000030 HC SOUP BX: Performed by: DIETITIAN, REGISTERED

## 2018-12-20 ENCOUNTER — OFFICE VISIT (OUTPATIENT)
Dept: NUTRITION | Facility: HOSPITAL | Age: 58
End: 2018-12-20
Attending: INTERNAL MEDICINE

## 2018-12-20 DIAGNOSIS — E66.01 MORBID OBESITY (CMS/HCC): ICD-10-CM

## 2018-12-20 PROCEDURE — 99000026 HC PROTEIN BAR BX

## 2018-12-20 PROCEDURE — 99000025 HC NEW DIRECTION BAR BX

## 2018-12-20 PROCEDURE — 99000028 HC PUDDING BX

## 2018-12-20 RX ORDER — LISINOPRIL 10 MG/1
10 TABLET ORAL DAILY
Qty: 90 TABLET | Refills: 0 | Status: SHIPPED | OUTPATIENT
Start: 2018-12-20 | End: 2019-08-30 | Stop reason: SDUPTHER

## 2019-01-03 ENCOUNTER — OFFICE VISIT (OUTPATIENT)
Dept: NUTRITION | Facility: HOSPITAL | Age: 59
End: 2019-01-03
Attending: INTERNAL MEDICINE

## 2019-01-03 DIAGNOSIS — E66.01 MORBID OBESITY (CMS/HCC): ICD-10-CM

## 2019-01-03 PROCEDURE — 99000022 HC GROUP EDUCATION: Performed by: DIETITIAN, REGISTERED

## 2019-01-03 PROCEDURE — 99000028 HC PUDDING BX: Performed by: DIETITIAN, REGISTERED

## 2019-01-03 PROCEDURE — 99000026 HC PROTEIN BAR BX: Performed by: DIETITIAN, REGISTERED

## 2019-01-03 PROCEDURE — 99000025 HC NEW DIRECTION BAR BX: Performed by: DIETITIAN, REGISTERED

## 2019-01-16 ENCOUNTER — CLINICAL SUPPORT (OUTPATIENT)
Dept: FAMILY MEDICINE | Facility: CLINIC | Age: 59
End: 2019-01-16
Payer: COMMERCIAL

## 2019-01-16 DIAGNOSIS — Z23 NEED FOR HEPATITIS A IMMUNIZATION: Primary | ICD-10-CM

## 2019-01-16 PROCEDURE — 90471 IMMUNIZATION ADMIN: CPT | Performed by: INTERNAL MEDICINE

## 2019-01-16 PROCEDURE — 90632 HEPA VACCINE ADULT IM: CPT | Performed by: INTERNAL MEDICINE

## 2019-01-17 ENCOUNTER — OFFICE VISIT (OUTPATIENT)
Dept: NUTRITION | Facility: HOSPITAL | Age: 59
End: 2019-01-17
Attending: INTERNAL MEDICINE

## 2019-01-17 DIAGNOSIS — E66.01 MORBID OBESITY (CMS/HCC): ICD-10-CM

## 2019-01-17 PROCEDURE — 99000025 HC NEW DIRECTION BAR BX

## 2019-01-17 PROCEDURE — 99000026 HC PROTEIN BAR BX

## 2019-01-17 PROCEDURE — 99000028 HC PUDDING BX

## 2019-01-31 ENCOUNTER — OFFICE VISIT (OUTPATIENT)
Dept: NUTRITION | Facility: HOSPITAL | Age: 59
End: 2019-01-31
Attending: INTERNAL MEDICINE

## 2019-01-31 DIAGNOSIS — E66.01 MORBID OBESITY (CMS/HCC): ICD-10-CM

## 2019-01-31 PROCEDURE — 99000026 HC PROTEIN BAR BX: Performed by: DIETITIAN, REGISTERED

## 2019-01-31 PROCEDURE — 99000025 HC NEW DIRECTION BAR BX: Performed by: DIETITIAN, REGISTERED

## 2019-01-31 PROCEDURE — 99000028 HC PUDDING BX: Performed by: DIETITIAN, REGISTERED

## 2019-02-14 ENCOUNTER — OFFICE VISIT (OUTPATIENT)
Dept: NUTRITION | Facility: HOSPITAL | Age: 59
End: 2019-02-14
Attending: INTERNAL MEDICINE

## 2019-02-14 DIAGNOSIS — E66.01 MORBID OBESITY (CMS/HCC): ICD-10-CM

## 2019-02-14 PROCEDURE — 99000028 HC PUDDING BX: Performed by: DIETITIAN, REGISTERED

## 2019-02-14 PROCEDURE — 99000026 HC PROTEIN BAR BX: Performed by: DIETITIAN, REGISTERED

## 2019-02-14 PROCEDURE — 99000025 HC NEW DIRECTION BAR BX: Performed by: DIETITIAN, REGISTERED

## 2019-03-07 ENCOUNTER — OFFICE VISIT (OUTPATIENT)
Dept: NUTRITION | Facility: HOSPITAL | Age: 59
End: 2019-03-07
Attending: INTERNAL MEDICINE

## 2019-03-07 DIAGNOSIS — E66.01 MORBID OBESITY (CMS/HCC): ICD-10-CM

## 2019-03-07 PROCEDURE — 99000026 HC PROTEIN BAR BX: Performed by: DIETITIAN, REGISTERED

## 2019-03-07 PROCEDURE — 99000028 HC PUDDING BX: Performed by: DIETITIAN, REGISTERED

## 2019-03-07 PROCEDURE — 99000025 HC NEW DIRECTION BAR BX: Performed by: DIETITIAN, REGISTERED

## 2019-03-21 ENCOUNTER — OFFICE VISIT (OUTPATIENT)
Dept: NUTRITION | Facility: HOSPITAL | Age: 59
End: 2019-03-21
Attending: INTERNAL MEDICINE

## 2019-03-21 DIAGNOSIS — E66.01 MORBID OBESITY (CMS/HCC): ICD-10-CM

## 2019-03-21 PROCEDURE — 99000025 HC NEW DIRECTION BAR BX: Performed by: DIETITIAN, REGISTERED

## 2019-04-04 ENCOUNTER — OFFICE VISIT (OUTPATIENT)
Dept: NUTRITION | Facility: HOSPITAL | Age: 59
End: 2019-04-04
Attending: INTERNAL MEDICINE

## 2019-04-04 DIAGNOSIS — E66.01 MORBID OBESITY (CMS/HCC): ICD-10-CM

## 2019-04-04 PROCEDURE — 99000025 HC NEW DIRECTION BAR BX

## 2019-04-04 PROCEDURE — 99000028 HC PUDDING BX

## 2019-04-18 ENCOUNTER — OFFICE VISIT (OUTPATIENT)
Dept: NUTRITION | Facility: HOSPITAL | Age: 59
End: 2019-04-18
Attending: INTERNAL MEDICINE

## 2019-04-18 DIAGNOSIS — E66.01 MORBID OBESITY (CMS/HCC): ICD-10-CM

## 2019-04-18 PROCEDURE — 99000026 HC PROTEIN BAR BX: Performed by: DIETITIAN, REGISTERED

## 2019-04-18 PROCEDURE — 99000028 HC PUDDING BX: Performed by: DIETITIAN, REGISTERED

## 2019-05-02 ENCOUNTER — OFFICE VISIT (OUTPATIENT)
Dept: NUTRITION | Facility: HOSPITAL | Age: 59
End: 2019-05-02
Attending: INTERNAL MEDICINE

## 2019-05-02 DIAGNOSIS — E66.01 MORBID OBESITY (CMS/HCC): ICD-10-CM

## 2019-05-02 PROCEDURE — 99000028 HC PUDDING BX: Performed by: DIETITIAN, REGISTERED

## 2019-05-02 PROCEDURE — 99000025 HC NEW DIRECTION BAR BX: Performed by: DIETITIAN, REGISTERED

## 2019-05-02 PROCEDURE — 99000026 HC PROTEIN BAR BX: Performed by: DIETITIAN, REGISTERED

## 2019-05-23 ENCOUNTER — OFFICE VISIT (OUTPATIENT)
Dept: NUTRITION | Facility: HOSPITAL | Age: 59
End: 2019-05-23
Attending: INTERNAL MEDICINE

## 2019-05-23 DIAGNOSIS — E66.01 MORBID OBESITY (CMS/HCC): ICD-10-CM

## 2019-05-23 PROCEDURE — 99000028 HC PUDDING BX: Performed by: DIETITIAN, REGISTERED

## 2019-05-23 PROCEDURE — 99000025 HC NEW DIRECTION BAR BX: Performed by: DIETITIAN, REGISTERED

## 2019-06-06 ENCOUNTER — OFFICE VISIT (OUTPATIENT)
Dept: NUTRITION | Facility: HOSPITAL | Age: 59
End: 2019-06-06
Attending: INTERNAL MEDICINE

## 2019-06-06 DIAGNOSIS — E66.01 MORBID OBESITY (CMS/HCC): ICD-10-CM

## 2019-06-06 PROCEDURE — 99000026 HC PROTEIN BAR BX: Performed by: DIETITIAN, REGISTERED

## 2019-06-06 PROCEDURE — 99000028 HC PUDDING BX: Performed by: DIETITIAN, REGISTERED

## 2019-06-06 PROCEDURE — 99000025 HC NEW DIRECTION BAR BX: Performed by: DIETITIAN, REGISTERED

## 2019-06-07 ENCOUNTER — TELEPHONE (OUTPATIENT)
Dept: FAMILY MEDICINE | Facility: CLINIC | Age: 59
End: 2019-06-07

## 2019-06-07 NOTE — TELEPHONE ENCOUNTER
Patient came into the office to get a script for the measels vaccine the pharamacy she uses is [facility]

## 2019-06-20 ENCOUNTER — OFFICE VISIT (OUTPATIENT)
Dept: NUTRITION | Facility: HOSPITAL | Age: 59
End: 2019-06-20
Attending: INTERNAL MEDICINE

## 2019-06-20 DIAGNOSIS — E66.01 MORBID OBESITY (CMS/HCC): ICD-10-CM

## 2019-06-20 PROCEDURE — 99000025 HC NEW DIRECTION BAR BX

## 2019-06-20 PROCEDURE — 99000028 HC PUDDING BX

## 2019-07-11 ENCOUNTER — OFFICE VISIT (OUTPATIENT)
Dept: NUTRITION | Facility: HOSPITAL | Age: 59
End: 2019-07-11
Attending: INTERNAL MEDICINE

## 2019-07-11 DIAGNOSIS — E66.01 MORBID OBESITY (CMS/HCC): ICD-10-CM

## 2019-07-11 PROCEDURE — 99000028 HC PUDDING BX

## 2019-07-11 PROCEDURE — 99000026 HC PROTEIN BAR BX

## 2019-07-18 ENCOUNTER — OFFICE VISIT (OUTPATIENT)
Dept: NUTRITION | Facility: HOSPITAL | Age: 59
End: 2019-07-18
Attending: INTERNAL MEDICINE

## 2019-07-18 DIAGNOSIS — E66.01 MORBID OBESITY (CMS/HCC): ICD-10-CM

## 2019-07-18 PROCEDURE — 99000028 HC PUDDING BX: Performed by: DIETITIAN, REGISTERED

## 2019-07-18 PROCEDURE — 99000025 HC NEW DIRECTION BAR BX: Performed by: DIETITIAN, REGISTERED

## 2019-07-18 PROCEDURE — 99000026 HC PROTEIN BAR BX: Performed by: DIETITIAN, REGISTERED

## 2019-08-01 ENCOUNTER — OFFICE VISIT (OUTPATIENT)
Dept: NUTRITION | Facility: HOSPITAL | Age: 59
End: 2019-08-01
Attending: INTERNAL MEDICINE

## 2019-08-01 DIAGNOSIS — E66.01 MORBID OBESITY (CMS/HCC): ICD-10-CM

## 2019-08-01 PROCEDURE — 99000028 HC PUDDING BX: Performed by: DIETITIAN, REGISTERED

## 2019-08-01 PROCEDURE — 99000025 HC NEW DIRECTION BAR BX: Performed by: DIETITIAN, REGISTERED

## 2019-08-15 ENCOUNTER — OFFICE VISIT (OUTPATIENT)
Dept: NUTRITION | Facility: HOSPITAL | Age: 59
End: 2019-08-15
Attending: INTERNAL MEDICINE

## 2019-08-15 DIAGNOSIS — E66.01 MORBID OBESITY (CMS/HCC): ICD-10-CM

## 2019-08-15 PROCEDURE — 99000025 HC NEW DIRECTION BAR BX

## 2019-08-15 PROCEDURE — 99000028 HC PUDDING BX

## 2019-08-29 ENCOUNTER — OFFICE VISIT (OUTPATIENT)
Dept: NUTRITION | Facility: HOSPITAL | Age: 59
End: 2019-08-29
Attending: INTERNAL MEDICINE

## 2019-08-29 DIAGNOSIS — E66.01 MORBID OBESITY (CMS/HCC): ICD-10-CM

## 2019-08-29 PROCEDURE — 99000028 HC PUDDING BX

## 2019-08-29 PROCEDURE — 99000025 HC NEW DIRECTION BAR BX

## 2019-08-29 PROCEDURE — 99000026 HC PROTEIN BAR BX

## 2019-08-30 RX ORDER — LISINOPRIL 10 MG/1
TABLET ORAL
Qty: 90 TABLET | Refills: 0 | Status: SHIPPED | OUTPATIENT
Start: 2019-08-30 | End: 2019-11-26 | Stop reason: SDUPTHER

## 2019-08-30 NOTE — TELEPHONE ENCOUNTER
Medicine Refill Request    Last Office Visit: Visit date not found  Next Office Visit: Visit date not found        Current Outpatient Prescriptions:   •  ergocalciferol (VITAMIN D2) 50,000 unit capsule, take 1 capsule by oral route  every week, Disp: , Rfl:   •  FLUoxetine (PROzac) 40 mg capsule, 40 mg., Disp: , Rfl:   •  glucosamine sulfate (GLUCOSAMINE) 500 mg tablet, 500 mg., Disp: , Rfl:   •  lisinopril (PRINIVIL) 10 mg tablet, Take 1 tablet (10 mg total) by mouth daily., Disp: 90 tablet, Rfl: 0  •  lithium (LITHOBID) 300 mg CR tablet, 300 mg., Disp: , Rfl:   •  omega 3-dha-epa-fish oil (FISH OIL) 1,000 mg (120 mg-180 mg) capsule, No SIG Entered, Disp: , Rfl:   •  VYVANSE 40 mg capsule, Take 40 mg by mouth every morning., Disp: , Rfl: 0      BP Readings from Last 3 Encounters:   07/19/18 136/78   04/26/18 127/62   04/02/18 (!) 148/70       Recent Lab results:  Lab Results   Component Value Date    CHOL 268 (H) 08/01/2018   ,   Lab Results   Component Value Date    HDL 88 08/01/2018   ,   Lab Results   Component Value Date    LDLCALC 169 (H) 08/01/2018   ,   Lab Results   Component Value Date    TRIG 54 08/01/2018        Lab Results   Component Value Date    GLUCOSE 104 (H) 04/26/2018   ,   Lab Results   Component Value Date    HGBA1C 5.2 08/01/2018         Lab Results   Component Value Date    CREATININE 0.6 04/26/2018       Lab Results   Component Value Date    TSH 1.08 08/01/2018

## 2019-09-12 ENCOUNTER — OFFICE VISIT (OUTPATIENT)
Dept: NUTRITION | Facility: HOSPITAL | Age: 59
End: 2019-09-12
Attending: INTERNAL MEDICINE

## 2019-09-12 DIAGNOSIS — E66.01 MORBID OBESITY (CMS/HCC): ICD-10-CM

## 2019-09-12 PROCEDURE — 99000026 HC PROTEIN BAR BX

## 2019-09-12 PROCEDURE — 99000028 HC PUDDING BX

## 2019-09-26 ENCOUNTER — OFFICE VISIT (OUTPATIENT)
Dept: NUTRITION | Facility: HOSPITAL | Age: 59
End: 2019-09-26
Attending: INTERNAL MEDICINE

## 2019-09-26 DIAGNOSIS — E66.01 MORBID OBESITY (CMS/HCC): ICD-10-CM

## 2019-09-26 PROCEDURE — 99000026 HC PROTEIN BAR BX

## 2019-09-26 PROCEDURE — 99000028 HC PUDDING BX

## 2019-09-26 PROCEDURE — 99000025 HC NEW DIRECTION BAR BX

## 2019-10-18 ENCOUNTER — OFFICE VISIT (OUTPATIENT)
Dept: FAMILY MEDICINE | Facility: CLINIC | Age: 59
End: 2019-10-18
Payer: COMMERCIAL

## 2019-10-18 VITALS
HEART RATE: 76 BPM | SYSTOLIC BLOOD PRESSURE: 140 MMHG | RESPIRATION RATE: 16 BRPM | TEMPERATURE: 98.7 F | DIASTOLIC BLOOD PRESSURE: 80 MMHG | BODY MASS INDEX: 30.82 KG/M2 | HEIGHT: 65 IN | OXYGEN SATURATION: 98 % | WEIGHT: 185 LBS

## 2019-10-18 DIAGNOSIS — R05.9 COUGH: ICD-10-CM

## 2019-10-18 DIAGNOSIS — R06.2 WHEEZING: Primary | ICD-10-CM

## 2019-10-18 DIAGNOSIS — J06.9 ACUTE URI: ICD-10-CM

## 2019-10-18 PROCEDURE — 99214 OFFICE O/P EST MOD 30 MIN: CPT | Mod: 25 | Performed by: INTERNAL MEDICINE

## 2019-10-18 PROCEDURE — 94640 AIRWAY INHALATION TREATMENT: CPT | Performed by: INTERNAL MEDICINE

## 2019-10-18 RX ORDER — FLUTICASONE PROPIONATE 50 MCG
1 SPRAY, SUSPENSION (ML) NASAL DAILY
COMMUNITY

## 2019-10-18 RX ORDER — DEXTROAMPHETAMINE SACCHARATE, AMPHETAMINE ASPARTATE, DEXTROAMPHETAMINE SULFATE AND AMPHETAMINE SULFATE 2.5; 2.5; 2.5; 2.5 MG/1; MG/1; MG/1; MG/1
TABLET ORAL AS NEEDED
Refills: 0 | COMMUNITY
Start: 2019-09-04

## 2019-10-18 RX ORDER — ALBUTEROL SULFATE 90 UG/1
2 INHALANT RESPIRATORY (INHALATION) EVERY 6 HOURS PRN
Qty: 1 INHALER | Refills: 1 | Status: SHIPPED | OUTPATIENT
Start: 2019-10-18 | End: 2020-07-08

## 2019-10-18 RX ORDER — ACETAMINOPHEN 500 MG
5000 TABLET ORAL DAILY
COMMUNITY

## 2019-10-18 RX ORDER — ALBUTEROL SULFATE 0.83 MG/ML
2.5 SOLUTION RESPIRATORY (INHALATION) ONCE
Status: SHIPPED | OUTPATIENT
Start: 2019-10-18 | End: 2019-10-19

## 2019-10-18 ASSESSMENT — ENCOUNTER SYMPTOMS
NEUROLOGICAL NEGATIVE: 1
DYSURIA: 0
TROUBLE SWALLOWING: 0
ABDOMINAL PAIN: 0
FLANK PAIN: 0
COUGH: 1
FREQUENCY: 0
WHEEZING: 1
EYE REDNESS: 0
APPETITE CHANGE: 0
NAUSEA: 0
HEMATOLOGIC/LYMPHATIC NEGATIVE: 1
DYSPHORIC MOOD: 0
DIARRHEA: 0
CARDIOVASCULAR NEGATIVE: 1
ACTIVITY CHANGE: 1
SHORTNESS OF BREATH: 1
WOUND: 0
FATIGUE: 1
CHEST TIGHTNESS: 0
FEVER: 0
MUSCULOSKELETAL NEGATIVE: 1
VOMITING: 0
SLEEP DISTURBANCE: 0
CONSTIPATION: 0
AGITATION: 0
DECREASED CONCENTRATION: 0

## 2019-10-18 NOTE — PROGRESS NOTES
"  Subjective     Patient ID: Diana Walls is a 58 y.o. female.    She is here with several concerns about allergy and asthma   She started her Advair about 2 weeks ago and is taking allergy meds   Feels like she can't get a full deep breath       Review of Systems   Constitutional: Positive for activity change and fatigue. Negative for appetite change and fever.   HENT: Negative for congestion, ear pain, nosebleeds, postnasal drip, tinnitus and trouble swallowing.    Eyes: Negative for redness and visual disturbance.   Respiratory: Positive for cough, shortness of breath and wheezing. Negative for chest tightness.    Cardiovascular: Negative.    Gastrointestinal: Negative for abdominal pain, constipation, diarrhea, nausea and vomiting.   Endocrine: Negative for cold intolerance and heat intolerance.   Genitourinary: Negative for dysuria, flank pain, frequency and urgency.   Musculoskeletal: Negative.    Skin: Negative for rash and wound.   Allergic/Immunologic: Negative for environmental allergies and food allergies.   Neurological: Negative.    Hematological: Negative.    Psychiatric/Behavioral: Negative for agitation, decreased concentration, dysphoric mood and sleep disturbance.       Objective     Vitals:    10/18/19 1212   BP: 140/80   BP Location: Left upper arm   Patient Position: Sitting   Pulse: 76   Resp: 16   Temp: 37.1 °C (98.7 °F)   TempSrc: Oral   SpO2: 98%   Weight: 83.9 kg (185 lb)   Height: 1.651 m (5' 5\")     Body mass index is 30.79 kg/m².    Physical Exam   Constitutional: She is oriented to person, place, and time. She appears well-developed. No distress.   HENT:   Head: Normocephalic.   Mouth/Throat: Oropharynx is clear and moist.   Eyes: Conjunctivae are normal. No scleral icterus.   Neck: Normal range of motion. No thyromegaly present.   Cardiovascular: Normal rate, regular rhythm and normal heart sounds.   No murmur heard.  Pulmonary/Chest: Effort normal. She has decreased breath sounds. " She has wheezes.   Musculoskeletal: She exhibits no edema.   Lymphadenopathy:     She has no cervical adenopathy.   Neurological: She is alert and oriented to person, place, and time.   Skin: Skin is warm and dry.   Psychiatric: She has a normal mood and affect. Her behavior is normal.   Nursing note and vitals reviewed.      Assessment/Plan   Diagnoses and all orders for this visit:    Wheezing (Primary)  Comments:  significant improvement after getting nebulizer treatment, sent in rx for albuterol, continue other meds, in future advised may need Advair 500/50    Cough    Acute URI    Other orders  -     albuterol HFA (VENTOLIN HFA) 90 mcg/actuation inhaler; Inhale 2 puffs every 6 (six) hours as needed for wheezing.

## 2019-10-24 ENCOUNTER — APPOINTMENT (OUTPATIENT)
Dept: NUTRITION | Facility: HOSPITAL | Age: 59
End: 2019-10-24
Attending: INTERNAL MEDICINE

## 2019-10-24 PROCEDURE — 99000026 HC PROTEIN BAR BX

## 2019-10-24 PROCEDURE — 99000025 HC NEW DIRECTION BAR BX

## 2019-11-07 ENCOUNTER — APPOINTMENT (OUTPATIENT)
Dept: NUTRITION | Facility: HOSPITAL | Age: 59
End: 2019-11-07
Attending: INTERNAL MEDICINE

## 2019-11-07 PROCEDURE — 99000026 HC PROTEIN BAR BX

## 2019-11-07 PROCEDURE — 99000025 HC NEW DIRECTION BAR BX

## 2019-11-07 PROCEDURE — 99000028 HC PUDDING BX

## 2019-11-21 ENCOUNTER — APPOINTMENT (OUTPATIENT)
Dept: NUTRITION | Facility: HOSPITAL | Age: 59
End: 2019-11-21
Attending: INTERNAL MEDICINE

## 2019-11-21 PROCEDURE — 99000028 HC PUDDING BX

## 2019-11-21 PROCEDURE — 99000025 HC NEW DIRECTION BAR BX

## 2019-11-26 RX ORDER — LISINOPRIL 10 MG/1
TABLET ORAL
Qty: 90 TABLET | Refills: 3 | Status: SHIPPED | OUTPATIENT
Start: 2019-11-26 | End: 2020-12-21

## 2019-11-26 NOTE — TELEPHONE ENCOUNTER
Medicine Refill Request    Last Office Visit: 10/18/2019  Next Office Visit: Visit date not found        Current Outpatient Medications:   •  albuterol HFA (VENTOLIN HFA) 90 mcg/actuation inhaler, Inhale 2 puffs every 6 (six) hours as needed for wheezing., Disp: 1 Inhaler, Rfl: 1  •  cholecalciferol, vitamin D3, 5,000 unit (125 mcg) tablet, Take 5,000 Units by mouth daily., Disp: , Rfl:   •  coenzyme Q10 (CO Q-10) 300 mg capsule, Take by mouth., Disp: , Rfl:   •  dextroamphetamine-amphetamine (ADDERALL) 10 mg tablet, as needed.  , Disp: , Rfl: 0  •  ergocalciferol (VITAMIN D2) 50,000 unit capsule, take 1 capsule by oral route  every week, Disp: , Rfl:   •  FLUoxetine (PROzac) 40 mg capsule, Take 80 mg by mouth daily.  , Disp: , Rfl:   •  fluticasone propionate (FLONASE) 50 mcg/actuation nasal spray, Administer 1 spray into each nostril daily., Disp: , Rfl:   •  glucosamine sulfate (GLUCOSAMINE) 500 mg tablet, 500 mg., Disp: , Rfl:   •  lisinopril (PRINIVIL) 10 mg tablet, TAKE 1 TABLET BY MOUTH EVERY DAY, Disp: 90 tablet, Rfl: 0  •  lithium 600 mg capsule, Take 600 mg by mouth nightly.  , Disp: , Rfl:   •  omega 3-dha-epa-fish oil (FISH OIL) 1,000 mg (120 mg-180 mg) capsule, No SIG Entered, Disp: , Rfl:   •  VYVANSE 40 mg capsule, Take 40 mg by mouth every morning., Disp: , Rfl: 0      BP Readings from Last 3 Encounters:   10/18/19 140/80   07/19/18 136/78   04/26/18 127/62       Recent Lab results:  Lab Results   Component Value Date    CHOL 268 (H) 08/01/2018   ,   Lab Results   Component Value Date    HDL 88 08/01/2018   ,   Lab Results   Component Value Date    LDLCALC 169 (H) 08/01/2018   ,   Lab Results   Component Value Date    TRIG 54 08/01/2018        Lab Results   Component Value Date    GLUCOSE 104 (H) 04/26/2018   ,   Lab Results   Component Value Date    HGBA1C 5.2 08/01/2018         Lab Results   Component Value Date    CREATININE 0.6 04/26/2018       Lab Results   Component Value Date    TSH 1.08  08/01/2018

## 2019-12-05 ENCOUNTER — APPOINTMENT (OUTPATIENT)
Dept: NUTRITION | Facility: HOSPITAL | Age: 59
End: 2019-12-05
Attending: INTERNAL MEDICINE

## 2019-12-05 PROCEDURE — 99000028 HC PUDDING BX

## 2019-12-05 PROCEDURE — 99000025 HC NEW DIRECTION BAR BX

## 2020-01-02 ENCOUNTER — APPOINTMENT (OUTPATIENT)
Dept: NUTRITION | Facility: HOSPITAL | Age: 60
End: 2020-01-02
Attending: INTERNAL MEDICINE

## 2020-01-02 PROCEDURE — 99000028 HC PUDDING BX

## 2020-01-02 PROCEDURE — 99000025 HC NEW DIRECTION BAR BX

## 2020-01-02 PROCEDURE — 99000026 HC PROTEIN BAR BX

## 2020-01-23 ENCOUNTER — APPOINTMENT (OUTPATIENT)
Dept: NUTRITION | Facility: HOSPITAL | Age: 60
End: 2020-01-23
Attending: INTERNAL MEDICINE

## 2020-01-23 PROCEDURE — 99000028 HC PUDDING BX

## 2020-01-23 PROCEDURE — 99000026 HC PROTEIN BAR BX

## 2020-01-23 PROCEDURE — 99000025 HC NEW DIRECTION BAR BX

## 2020-03-12 ENCOUNTER — TELEPHONE (OUTPATIENT)
Dept: FAMILY MEDICINE | Facility: CLINIC | Age: 60
End: 2020-03-12

## 2020-03-12 ENCOUNTER — APPOINTMENT (OUTPATIENT)
Dept: NUTRITION | Facility: HOSPITAL | Age: 60
End: 2020-03-12
Attending: INTERNAL MEDICINE

## 2020-03-12 PROCEDURE — 99000028 HC PUDDING BX

## 2020-03-12 PROCEDURE — 99000026 HC PROTEIN BAR BX

## 2020-03-12 PROCEDURE — 99000025 HC NEW DIRECTION BAR BX

## 2020-03-12 RX ORDER — AZELASTINE 1 MG/ML
1 SPRAY, METERED NASAL 2 TIMES DAILY
Qty: 30 ML | Refills: 5 | Status: SHIPPED | OUTPATIENT
Start: 2020-03-12 | End: 2021-01-06

## 2020-03-12 NOTE — TELEPHONE ENCOUNTER
Pt was transferred to office from call  who stated pt was experiencing SOB.    After clarifying questions it was determined pt has nasal congestion and does not feel her rx for Flonase and Allegra are working any longer.    Pt is requesting an alternate rx.    Pt phone: [phone] (home) [phone] (work)      Pharmacy: [id number] - [place] - [address] [place] [address]

## 2020-05-21 ENCOUNTER — APPOINTMENT (OUTPATIENT)
Dept: NUTRITION | Facility: HOSPITAL | Age: 60
End: 2020-05-21
Attending: INTERNAL MEDICINE

## 2020-05-21 PROCEDURE — 99000026 HC PROTEIN BAR BX

## 2020-05-21 PROCEDURE — 99000028 HC PUDDING BX

## 2020-05-21 PROCEDURE — 99000025 HC NEW DIRECTION BAR BX

## 2020-07-08 RX ORDER — ALBUTEROL SULFATE 90 UG/1
INHALANT RESPIRATORY (INHALATION)
Qty: 6.7 INHALER | Refills: 1 | Status: SHIPPED | OUTPATIENT
Start: 2020-07-08 | End: 2020-09-25

## 2020-07-08 NOTE — TELEPHONE ENCOUNTER
Ok to schedule telemed for now if she prefers- since she lives far away and may take awhile to schedule physical

## 2020-07-08 NOTE — TELEPHONE ENCOUNTER
Medicine Refill Request    Last Office Visit: 10/18/2019  Last Telemedicine Visit: Visit date not found    Next Office Visit: Visit date not found  Next Telemedicine Visit: Visit date not found         Current Outpatient Medications:   •  albuterol HFA (VENTOLIN HFA) 90 mcg/actuation inhaler, Inhale 2 puffs every 6 (six) hours as needed for wheezing., Disp: 1 Inhaler, Rfl: 1  •  azelastine (ASTELIN) 137 mcg (0.1 %) nasal spray, Administer 1 spray into each nostril 2 (two) times a day. Use in each nostril as directed., Disp: 30 mL, Rfl: 5  •  cholecalciferol, vitamin D3, 5,000 unit (125 mcg) tablet, Take 5,000 Units by mouth daily., Disp: , Rfl:   •  coenzyme Q10 (CO Q-10) 300 mg capsule, Take by mouth., Disp: , Rfl:   •  dextroamphetamine-amphetamine (ADDERALL) 10 mg tablet, as needed.  , Disp: , Rfl: 0  •  ergocalciferol (VITAMIN D2) 50,000 unit capsule, take 1 capsule by oral route  every week, Disp: , Rfl:   •  FLUoxetine (PROzac) 40 mg capsule, Take 80 mg by mouth daily.  , Disp: , Rfl:   •  fluticasone propionate (FLONASE) 50 mcg/actuation nasal spray, Administer 1 spray into each nostril daily., Disp: , Rfl:   •  glucosamine sulfate (GLUCOSAMINE) 500 mg tablet, 500 mg., Disp: , Rfl:   •  lisinopril (PRINIVIL) 10 mg tablet, TAKE 1 TABLET BY MOUTH EVERY DAY, Disp: 90 tablet, Rfl: 3  •  lithium 600 mg capsule, Take 600 mg by mouth nightly.  , Disp: , Rfl:   •  omega 3-dha-epa-fish oil (FISH OIL) 1,000 mg (120 mg-180 mg) capsule, No SIG Entered, Disp: , Rfl:   •  VYVANSE 40 mg capsule, Take 40 mg by mouth every morning., Disp: , Rfl: 0      BP Readings from Last 3 Encounters:   10/18/19 140/80   07/19/18 136/78   04/26/18 127/62       Recent Lab results:  Lab Results   Component Value Date    CHOL 268 (H) 08/01/2018   ,   Lab Results   Component Value Date    HDL 88 08/01/2018   ,   Lab Results   Component Value Date    LDLCALC 169 (H) 08/01/2018   ,   Lab Results   Component Value Date    TRIG 54 08/01/2018         Lab Results   Component Value Date    GLUCOSE 104 (H) 04/26/2018   ,   Lab Results   Component Value Date    HGBA1C 5.2 08/01/2018         Lab Results   Component Value Date    CREATININE 0.6 04/26/2018       Lab Results   Component Value Date    TSH 1.08 08/01/2018

## 2020-09-25 RX ORDER — ALBUTEROL SULFATE 90 UG/1
INHALANT RESPIRATORY (INHALATION)
Qty: 6.7 INHALER | Refills: 1 | Status: SHIPPED | OUTPATIENT
Start: 2020-09-25

## 2020-09-25 NOTE — TELEPHONE ENCOUNTER
Medicine Refill Request    Last Office Visit: 10/18/2019  Last Telemedicine Visit: Visit date not found    Next Office Visit: Visit date not found  Next Telemedicine Visit: Visit date not found         Current Outpatient Medications:   •  albuterol HFA (VENTOLIN HFA) 90 mcg/actuation inhaler, TAKE 2 PUFFS BY MOUTH EVERY 6 HOURS AS NEEDED FOR WHEEZE, Disp: 6.7 Inhaler, Rfl: 1  •  azelastine (ASTELIN) 137 mcg (0.1 %) nasal spray, Administer 1 spray into each nostril 2 (two) times a day. Use in each nostril as directed., Disp: 30 mL, Rfl: 5  •  cholecalciferol, vitamin D3, 5,000 unit (125 mcg) tablet, Take 5,000 Units by mouth daily., Disp: , Rfl:   •  coenzyme Q10 (CO Q-10) 300 mg capsule, Take by mouth., Disp: , Rfl:   •  dextroamphetamine-amphetamine (ADDERALL) 10 mg tablet, as needed.  , Disp: , Rfl: 0  •  ergocalciferol (VITAMIN D2) 50,000 unit capsule, take 1 capsule by oral route  every week, Disp: , Rfl:   •  FLUoxetine (PROzac) 40 mg capsule, Take 80 mg by mouth daily.  , Disp: , Rfl:   •  fluticasone propionate (FLONASE) 50 mcg/actuation nasal spray, Administer 1 spray into each nostril daily., Disp: , Rfl:   •  glucosamine sulfate (GLUCOSAMINE) 500 mg tablet, 500 mg., Disp: , Rfl:   •  lisinopril (PRINIVIL) 10 mg tablet, TAKE 1 TABLET BY MOUTH EVERY DAY, Disp: 90 tablet, Rfl: 3  •  lithium 600 mg capsule, Take 600 mg by mouth nightly.  , Disp: , Rfl:   •  omega 3-dha-epa-fish oil (FISH OIL) 1,000 mg (120 mg-180 mg) capsule, No SIG Entered, Disp: , Rfl:   •  VYVANSE 40 mg capsule, Take 40 mg by mouth every morning., Disp: , Rfl: 0      BP Readings from Last 3 Encounters:   10/18/19 140/80   07/19/18 136/78   04/26/18 127/62       Recent Lab results:  Lab Results   Component Value Date    CHOL 268 (H) 08/01/2018   ,   Lab Results   Component Value Date    HDL 88 08/01/2018   ,   Lab Results   Component Value Date    LDLCALC 169 (H) 08/01/2018   ,   Lab Results   Component Value Date    TRIG 54 08/01/2018         Lab Results   Component Value Date    GLUCOSE 104 (H) 04/26/2018   ,   Lab Results   Component Value Date    HGBA1C 5.2 08/01/2018         Lab Results   Component Value Date    CREATININE 0.6 04/26/2018       Lab Results   Component Value Date    TSH 1.08 08/01/2018

## 2020-10-20 ENCOUNTER — TELEPHONE (OUTPATIENT)
Dept: FAMILY MEDICINE | Facility: CLINIC | Age: 60
End: 2020-10-20

## 2020-10-20 DIAGNOSIS — Z00.00 ANNUAL PHYSICAL EXAM: Primary | ICD-10-CM

## 2020-10-20 DIAGNOSIS — R79.89 ABNORMAL TSH: ICD-10-CM

## 2020-10-20 DIAGNOSIS — R53.83 FATIGUE, UNSPECIFIED TYPE: ICD-10-CM

## 2020-10-20 DIAGNOSIS — E66.9 OBESITY, UNSPECIFIED CLASSIFICATION, UNSPECIFIED OBESITY TYPE, UNSPECIFIED WHETHER SERIOUS COMORBIDITY PRESENT: ICD-10-CM

## 2020-10-20 DIAGNOSIS — R73.09 ELEVATED GLUCOSE: ICD-10-CM

## 2020-10-20 DIAGNOSIS — E78.2 MIXED HYPERLIPIDEMIA: ICD-10-CM

## 2020-10-20 DIAGNOSIS — I10 ESSENTIAL HYPERTENSION: ICD-10-CM

## 2020-10-20 DIAGNOSIS — E55.9 VITAMIN D DEFICIENCY: ICD-10-CM

## 2020-10-20 NOTE — TELEPHONE ENCOUNTER
This patient has an upcoming appointment with your office and would like to have their lab work completed prior to the visit.                    Patient Preferred Laboratory: [facility]    Patient Primary Insurance in Epic:  [employer]         or [place] Mail?: Mail

## 2020-10-21 NOTE — TELEPHONE ENCOUNTER
Is there any additional labs you would like done for visit  I see some in there from 11/19 I will call the patient and let her know that labs are ordered and at lab [facility] Please advise

## 2020-11-25 LAB
25(OH)D3+25(OH)D2 SERPL-MCNC: 35.2 NG/ML (ref 30–100)
BASOPHILS # BLD AUTO: 0 X10E3/UL (ref 0–0.2)
BASOPHILS NFR BLD AUTO: 1 %
CHOLEST SERPL-MCNC: 255 MG/DL (ref 100–199)
EOSINOPHIL # BLD AUTO: 0.2 X10E3/UL (ref 0–0.4)
EOSINOPHIL NFR BLD AUTO: 3 %
ERYTHROCYTE [DISTWIDTH] IN BLOOD BY AUTOMATED COUNT: 12.5 % (ref 11.7–15.4)
HBA1C MFR BLD: 5.8 % (ref 4.8–5.6)
HCT VFR BLD AUTO: 40.7 % (ref 34–46.6)
HDLC SERPL-MCNC: 95 MG/DL
HGB BLD-MCNC: 13.3 G/DL (ref 11.1–15.9)
IMM GRANULOCYTES # BLD AUTO: 0 X10E3/UL (ref 0–0.1)
IMM GRANULOCYTES NFR BLD AUTO: 0 %
LDLC SERPL CALC-MCNC: 152 MG/DL (ref 0–99)
LYMPHOCYTES # BLD AUTO: 0.9 X10E3/UL (ref 0.7–3.1)
LYMPHOCYTES NFR BLD AUTO: 18 %
MCH RBC QN AUTO: 29.5 PG (ref 26.6–33)
MCHC RBC AUTO-ENTMCNC: 32.7 G/DL (ref 31.5–35.7)
MCV RBC AUTO: 90 FL (ref 79–97)
MONOCYTES # BLD AUTO: 0.4 X10E3/UL (ref 0.1–0.9)
MONOCYTES NFR BLD AUTO: 9 %
NEUTROPHILS # BLD AUTO: 3.4 X10E3/UL (ref 1.4–7)
NEUTROPHILS NFR BLD AUTO: 69 %
PLATELET # BLD AUTO: 317 X10E3/UL (ref 150–450)
RBC # BLD AUTO: 4.51 X10E6/UL (ref 3.77–5.28)
TRIGL SERPL-MCNC: 53 MG/DL (ref 0–149)
VLDLC SERPL CALC-MCNC: 8 MG/DL (ref 5–40)
WBC # BLD AUTO: 4.8 X10E3/UL (ref 3.4–10.8)

## 2020-11-26 LAB
ALBUMIN SERPL-MCNC: 4.5 G/DL (ref 3.8–4.8)
ALBUMIN/GLOB SERPL: 1.7 {RATIO} (ref 1.2–2.2)
ALP SERPL-CCNC: 82 IU/L (ref 39–117)
ALT SERPL-CCNC: 18 IU/L (ref 0–32)
AST SERPL-CCNC: 22 IU/L (ref 0–40)
BILIRUB SERPL-MCNC: 0.3 MG/DL (ref 0–1.2)
BUN SERPL-MCNC: 15 MG/DL (ref 8–27)
BUN/CREAT SERPL: 19 (ref 12–28)
CALCIUM SERPL-MCNC: 9.7 MG/DL (ref 8.7–10.3)
CHLORIDE SERPL-SCNC: 99 MMOL/L (ref 96–106)
CO2 SERPL-SCNC: 26 MMOL/L (ref 20–29)
CREAT SERPL-MCNC: 0.77 MG/DL (ref 0.57–1)
GLOBULIN SER CALC-MCNC: 2.7 G/DL (ref 1.5–4.5)
GLUCOSE SERPL-MCNC: 94 MG/DL (ref 65–99)
LAB CORP EGFR IF AFRICN AM: 96 ML/MIN/1.73
LAB CORP EGFR IF NONAFRICN AM: 84 ML/MIN/1.73
POTASSIUM SERPL-SCNC: 4.6 MMOL/L (ref 3.5–5.2)
PROT SERPL-MCNC: 7.2 G/DL (ref 6–8.5)
SODIUM SERPL-SCNC: 144 MMOL/L (ref 134–144)
TSH SERPL DL<=0.005 MIU/L-ACNC: 1.92 UIU/ML (ref 0.45–4.5)

## 2020-12-01 ENCOUNTER — TELEPHONE (OUTPATIENT)
Dept: FAMILY MEDICINE | Facility: CLINIC | Age: 60
End: 2020-12-01

## 2020-12-01 ENCOUNTER — OFFICE VISIT (OUTPATIENT)
Dept: FAMILY MEDICINE | Facility: CLINIC | Age: 60
End: 2020-12-01
Payer: COMMERCIAL

## 2020-12-01 VITALS
OXYGEN SATURATION: 96 % | SYSTOLIC BLOOD PRESSURE: 122 MMHG | HEART RATE: 75 BPM | HEIGHT: 64 IN | DIASTOLIC BLOOD PRESSURE: 60 MMHG | RESPIRATION RATE: 18 BRPM | WEIGHT: 200 LBS | BODY MASS INDEX: 34.15 KG/M2 | TEMPERATURE: 98.5 F

## 2020-12-01 DIAGNOSIS — Z12.31 ENCOUNTER FOR SCREENING MAMMOGRAM FOR MALIGNANT NEOPLASM OF BREAST: ICD-10-CM

## 2020-12-01 DIAGNOSIS — E53.8 DISORDER OF VITAMIN B12: ICD-10-CM

## 2020-12-01 DIAGNOSIS — Z01.419 PAP SMEAR, AS PART OF ROUTINE GYNECOLOGICAL EXAMINATION: ICD-10-CM

## 2020-12-01 DIAGNOSIS — E78.2 MIXED HYPERLIPIDEMIA: ICD-10-CM

## 2020-12-01 DIAGNOSIS — R41.840 ATTENTION DISTURBANCE: ICD-10-CM

## 2020-12-01 DIAGNOSIS — Z00.00 ENCOUNTER FOR GENERAL ADULT MEDICAL EXAMINATION WITHOUT ABNORMAL FINDINGS: Primary | ICD-10-CM

## 2020-12-01 DIAGNOSIS — F32.A DEPRESSIVE DISORDER: ICD-10-CM

## 2020-12-01 DIAGNOSIS — I10 ESSENTIAL HYPERTENSION: ICD-10-CM

## 2020-12-01 DIAGNOSIS — J45.20 MILD INTERMITTENT ASTHMA, UNSPECIFIED WHETHER COMPLICATED: ICD-10-CM

## 2020-12-01 PROCEDURE — 99999 PR OFFICE/OUTPT VISIT,PROCEDURE ONLY: CPT | Performed by: INTERNAL MEDICINE

## 2020-12-01 PROCEDURE — 99396 PREV VISIT EST AGE 40-64: CPT | Performed by: INTERNAL MEDICINE

## 2020-12-01 PROCEDURE — 93000 ELECTROCARDIOGRAM COMPLETE: CPT | Performed by: INTERNAL MEDICINE

## 2020-12-01 RX ORDER — MINERAL OIL
180 ENEMA (ML) RECTAL DAILY
COMMUNITY

## 2020-12-01 RX ORDER — CHROMIUM PICOLINATE 200 MCG
TABLET ORAL DAILY
COMMUNITY

## 2020-12-01 ASSESSMENT — ENCOUNTER SYMPTOMS
CONSTITUTIONAL NEGATIVE: 1
DIARRHEA: 0
SLEEP DISTURBANCE: 0
NAUSEA: 0
FLANK PAIN: 0
DYSPHORIC MOOD: 0
RESPIRATORY NEGATIVE: 1
TROUBLE SWALLOWING: 0
MUSCULOSKELETAL NEGATIVE: 1
EYE REDNESS: 0
AGITATION: 0
CONSTIPATION: 0
HEMATOLOGIC/LYMPHATIC NEGATIVE: 1
SORE THROAT: 0
CARDIOVASCULAR NEGATIVE: 1
DYSURIA: 0
DECREASED CONCENTRATION: 0
ABDOMINAL PAIN: 0
WOUND: 0
NEUROLOGICAL NEGATIVE: 1
VOMITING: 0
FREQUENCY: 0

## 2020-12-01 NOTE — ASSESSMENT & PLAN NOTE
Discussed her results and she does not want to take a statin at this point - encouraged her to try and work on her diet and exercise

## 2020-12-01 NOTE — PROGRESS NOTES
"  Subjective     Patient ID: Diana Walls is a 60 y.o. female.    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    HENT: Negative for ear pain, nosebleeds, sore throat, tinnitus and trouble swallowing.    Eyes: Negative for redness and visual disturbance.   Respiratory: Negative.    Cardiovascular: Negative.    Gastrointestinal: Negative for abdominal pain, constipation, diarrhea, nausea and vomiting.   Endocrine: Negative for cold intolerance and heat intolerance.   Genitourinary: Negative for dysuria, flank pain, frequency and urgency.   Musculoskeletal: Negative.    Skin: Negative for rash and wound.   Allergic/Immunologic: Negative for environmental allergies and food allergies.   Neurological: Negative.    Hematological: Negative.    Psychiatric/Behavioral: Negative for agitation, decreased concentration, dysphoric mood and sleep disturbance.       Objective     Vitals:    12/01/20 1500   BP: 122/60   BP Location: Left upper arm   Patient Position: Sitting   Pulse: 75   Resp: 18   Temp: 36.9 °C (98.5 °F)   SpO2: 96%   Weight: 90.7 kg (200 lb)   Height: 1.626 m (5' 4\")     Body mass index is 34.33 kg/m².    Physical Exam  Vitals signs and nursing note reviewed.   Constitutional:       General: She is not in acute distress.     Appearance: She is well-developed.   HENT:      Head: Normocephalic.      Nose: Nose normal.   Eyes:      Conjunctiva/sclera: Conjunctivae normal.      Pupils: Pupils are equal, round, and reactive to light.   Neck:      Musculoskeletal: Normal range of motion.      Thyroid: No thyromegaly.      Vascular: No JVD.   Cardiovascular:      Rate and Rhythm: Normal rate and regular rhythm.      Heart sounds: Normal heart sounds. No murmur.   Pulmonary:      Effort: Pulmonary " effort is normal. No respiratory distress.      Breath sounds: Normal breath sounds. No stridor. No wheezing.   Chest:      Breasts:         Right: No inverted nipple, mass, nipple discharge, skin change or tenderness.         Left: No inverted nipple, mass, nipple discharge, skin change or tenderness.   Abdominal:      General: Bowel sounds are normal. There is no distension.      Palpations: Abdomen is soft. There is no mass.      Tenderness: There is no abdominal tenderness.      Hernia: No hernia is present.   Genitourinary:     Exam position: Supine.      Labia:         Right: No tenderness or lesion.         Left: No tenderness or lesion.       Vagina: Normal. No vaginal discharge.      Cervix: No cervical motion tenderness or discharge.      Adnexa:         Right: No mass, tenderness or fullness.          Left: No mass, tenderness or fullness.        Rectum: No anal fissure or external hemorrhoid.   Musculoskeletal: Normal range of motion.         General: No deformity.   Lymphadenopathy:      Cervical: No cervical adenopathy.   Skin:     General: Skin is warm and dry.      Capillary Refill: Capillary refill takes less than 2 seconds.      Findings: No rash.   Neurological:      Mental Status: She is alert and oriented to person, place, and time.      Cranial Nerves: No cranial nerve deficit.      Coordination: Coordination normal.   Psychiatric:         Behavior: Behavior normal.         Thought Content: Thought content normal.         Judgment: Judgment normal.         Assessment/Plan   Diagnoses and all orders for this visit:    Encounter for general adult medical examination without abnormal findings (Primary)  Comments:  gyn done today, ordered mammogram, declines Pneumovax, labs reviewed and are mostly in range with exception of lipids, (see note)    Mixed hyperlipidemia  Assessment & Plan:  Discussed her results and she does not want to take a statin at this point - encouraged her to try and work on  her diet and exercise         Essential hypertension  Assessment & Plan:  BP remains well controlled on current regimen     Orders:  -     ECG 12 LEAD OFFICE PERFORMED    Disorder of vitamin B12    Depressive disorder    Attention disturbance  Assessment & Plan:  Adequately controlled on Adderall       Encounter for screening mammogram for malignant neoplasm of breast  -     BI SCREENING MAMMOGRAM BILATERAL; Future    Mild intermittent asthma, unspecified whether complicated    Pap smear, as part of routine gynecological examination  -     Cytology, Thinprep Pap  -     PAP W/REFLEX HR HPV W/REFLEX GENOTYPING 16,18/45

## 2020-12-04 LAB
CYTOLOGIST CVX/VAG CYTO: NORMAL
CYTOLOGY CVX/VAG DOC CYTO: NORMAL
CYTOLOGY CVX/VAG DOC THIN PREP: NORMAL
DX ICD CODE: NORMAL
LAB CORP .: NORMAL
LAB CORP NOTE:: NORMAL
OTHER STN SPEC: NORMAL
STAT OF ADQ CVX/VAG CYTO-IMP: NORMAL

## 2020-12-21 RX ORDER — LISINOPRIL 10 MG/1
TABLET ORAL
Qty: 90 TABLET | Refills: 3 | Status: SHIPPED | OUTPATIENT
Start: 2020-12-21

## 2020-12-21 NOTE — TELEPHONE ENCOUNTER
Medicine Refill Request    Last Office Visit: 12/1/2020  Last Telemedicine Visit: Visit date not found    Next Office Visit: Visit date not found  Next Telemedicine Visit: Visit date not found         Current Outpatient Medications:   •  albuterol HFA (VENTOLIN HFA) 90 mcg/actuation inhaler, INHALE 2 PUFFS BY MOUTH EVERY 6 HOURS AS NEEDED FOR WHEEZE, Disp: 6.7 Inhaler, Rfl: 1  •  ascorbic acid-elderberry fruit (AIRBORNE, ELDERBERRY,) 100-50 mg tablet,chewable, Take by mouth daily., Disp: , Rfl:   •  azelastine (ASTELIN) 137 mcg (0.1 %) nasal spray, Administer 1 spray into each nostril 2 (two) times a day. Use in each nostril as directed., Disp: 30 mL, Rfl: 5  •  cholecalciferol, vitamin D3, 5,000 unit (125 mcg) tablet, Take 5,000 Units by mouth daily., Disp: , Rfl:   •  dextroamphetamine-amphetamine (ADDERALL) 10 mg tablet, as needed.  , Disp: , Rfl: 0  •  fexofenadine (ALLEGRA) 180 mg tablet, Take 180 mg by mouth daily., Disp: , Rfl:   •  FLUoxetine (PROzac) 40 mg capsule, Take 80 mg by mouth daily.  , Disp: , Rfl:   •  fluticasone propionate (FLONASE) 50 mcg/actuation nasal spray, Administer 1 spray into each nostril daily., Disp: , Rfl:   •  lisinopril (PRINIVIL) 10 mg tablet, TAKE 1 TABLET BY MOUTH EVERY DAY, Disp: 90 tablet, Rfl: 3  •  lithium 300 mg tablet, Take 300 mg by mouth nightly.  , Disp: , Rfl:   •  multivitamin tablet, Take 1 tablet by mouth daily., Disp: , Rfl:   •  TURMERIC ORAL, Take by mouth daily., Disp: , Rfl:   •  VYVANSE 40 mg capsule, Take 40 mg by mouth every morning., Disp: , Rfl: 0      BP Readings from Last 3 Encounters:   12/01/20 122/60   10/18/19 140/80   07/19/18 136/78       Recent Lab results:  Lab Results   Component Value Date    CHOL 255 (H) 11/24/2020   ,   Lab Results   Component Value Date    HDL 95 11/24/2020   ,   Lab Results   Component Value Date    LDLCALC 152 (H) 11/24/2020   ,   Lab Results   Component Value Date    TRIG 53 11/24/2020        Lab Results   Component  Value Date    GLUCOSE 94 11/24/2020   ,   Lab Results   Component Value Date    HGBA1C 5.8 (H) 11/24/2020         Lab Results   Component Value Date    CREATININE 0.77 11/24/2020       Lab Results   Component Value Date    TSH 1.920 11/24/2020

## 2021-01-06 RX ORDER — AZELASTINE 1 MG/ML
SPRAY, METERED NASAL
Qty: 30 ML | Refills: 3 | Status: SHIPPED | OUTPATIENT
Start: 2021-01-06

## 2021-09-21 ENCOUNTER — DOCUMENTATION (OUTPATIENT)
Dept: FAMILY MEDICINE | Facility: CLINIC | Age: 61
End: 2021-09-21

## 2021-09-21 DIAGNOSIS — Z12.31 ENCOUNTER FOR SCREENING MAMMOGRAM FOR MALIGNANT NEOPLASM OF BREAST: Primary | ICD-10-CM

## 2021-09-21 NOTE — PROGRESS NOTES
Care Gap Team has outreached to Diana Walls on behalf of their primary care provider.      Care Gap Source: Adrienne Elizondo    Care Gap(s) Identified: Breast Cancer Screening                   Chart Review Completed: Yes                 Patient is showing as being due for a breast cancer screening. I have been unsuccessful in contacting the patient. I will mail an unable to contact letter to the patient's home, along with an open order.

## 2022-04-07 ENCOUNTER — NEW PATIENT (OUTPATIENT)
Dept: URBAN - METROPOLITAN AREA CLINIC 79 | Facility: CLINIC | Age: 62
End: 2022-04-07

## 2022-04-07 DIAGNOSIS — H52.13: ICD-10-CM

## 2022-04-07 PROCEDURE — MISCOPTOS MISCELLANEOUS, OPTOS

## 2022-04-07 PROCEDURE — 92015 DETERMINE REFRACTIVE STATE: CPT

## 2022-04-07 PROCEDURE — 92004 COMPRE OPH EXAM NEW PT 1/>: CPT

## 2022-04-07 ASSESSMENT — VISUAL ACUITY
OD_SC: 20/40
OD_CC: 20/20-1
OD_CC: 20/25
OS_SC: 20/40+2
OS_CC: 20/25
OS_CC: 20/40

## 2022-04-07 ASSESSMENT — TONOMETRY
OD_IOP_MMHG: 13
OS_IOP_MMHG: 13

## 2024-11-12 ENCOUNTER — HOSPITAL ENCOUNTER (OUTPATIENT)
Dept: HOSPITAL 99 - RAD | Age: 64
End: 2024-11-12
Payer: COMMERCIAL

## 2024-11-12 DIAGNOSIS — R05.1: Primary | ICD-10-CM

## 2024-11-16 ENCOUNTER — HOSPITAL ENCOUNTER (INPATIENT)
Dept: HOSPITAL 99 - 4 WEST ACU | Age: 64
LOS: 4 days | Discharge: HOME | DRG: 871 | End: 2024-11-20
Payer: COMMERCIAL

## 2024-11-16 VITALS — RESPIRATION RATE: 18 BRPM | DIASTOLIC BLOOD PRESSURE: 78 MMHG | SYSTOLIC BLOOD PRESSURE: 129 MMHG

## 2024-11-16 VITALS — DIASTOLIC BLOOD PRESSURE: 81 MMHG | SYSTOLIC BLOOD PRESSURE: 123 MMHG

## 2024-11-16 VITALS — DIASTOLIC BLOOD PRESSURE: 73 MMHG | SYSTOLIC BLOOD PRESSURE: 134 MMHG

## 2024-11-16 VITALS — DIASTOLIC BLOOD PRESSURE: 80 MMHG | SYSTOLIC BLOOD PRESSURE: 147 MMHG

## 2024-11-16 VITALS — SYSTOLIC BLOOD PRESSURE: 122 MMHG | DIASTOLIC BLOOD PRESSURE: 71 MMHG

## 2024-11-16 VITALS — DIASTOLIC BLOOD PRESSURE: 75 MMHG | SYSTOLIC BLOOD PRESSURE: 123 MMHG | RESPIRATION RATE: 18 BRPM

## 2024-11-16 VITALS — BODY MASS INDEX: 34.7 KG/M2 | BODY MASS INDEX: 34.4 KG/M2

## 2024-11-16 DIAGNOSIS — E55.9: ICD-10-CM

## 2024-11-16 DIAGNOSIS — G43.909: ICD-10-CM

## 2024-11-16 DIAGNOSIS — R73.03: ICD-10-CM

## 2024-11-16 DIAGNOSIS — J96.01: ICD-10-CM

## 2024-11-16 DIAGNOSIS — Z88.8: ICD-10-CM

## 2024-11-16 DIAGNOSIS — E87.5: ICD-10-CM

## 2024-11-16 DIAGNOSIS — Z20.822: ICD-10-CM

## 2024-11-16 DIAGNOSIS — A41.9: Primary | ICD-10-CM

## 2024-11-16 DIAGNOSIS — D64.9: ICD-10-CM

## 2024-11-16 DIAGNOSIS — K21.9: ICD-10-CM

## 2024-11-16 DIAGNOSIS — K44.9: ICD-10-CM

## 2024-11-16 DIAGNOSIS — I10: ICD-10-CM

## 2024-11-16 DIAGNOSIS — F32.A: ICD-10-CM

## 2024-11-16 DIAGNOSIS — F41.9: ICD-10-CM

## 2024-11-16 DIAGNOSIS — E78.5: ICD-10-CM

## 2024-11-16 DIAGNOSIS — D75.839: ICD-10-CM

## 2024-11-16 DIAGNOSIS — J45.31: ICD-10-CM

## 2024-11-16 DIAGNOSIS — Z79.899: ICD-10-CM

## 2024-11-16 DIAGNOSIS — J18.9: ICD-10-CM

## 2024-11-16 LAB
ALBUMIN SERPL-MCNC: 4.1 G/DL (ref 3.5–5)
ALP SERPL-CCNC: 98 U/L (ref 38–126)
ALT SERPL-CCNC: 73 U/L (ref 0–35)
AST SERPL-CCNC: 43 U/L (ref 14–36)
BUN SERPL-MCNC: 23 MG/DL (ref 7–17)
CALCIUM SERPL-MCNC: 9.8 MG/DL (ref 8.4–10.2)
CHLORIDE SERPL-SCNC: 100 MMOL/L (ref 98–107)
CO2 SERPL-SCNC: 25 MMOL/L (ref 22–30)
EGFR: > 60
ERYTHROCYTE [DISTWIDTH] IN BLOOD BY AUTOMATED COUNT: 13.6 % (ref 11.5–14.5)
ESTIMATED CREATININE CLEARANCE: 91 ML/MIN
GLUCOSE SERPL-MCNC: 138 MG/DL (ref 70–99)
HCT VFR BLD AUTO: 38.2 % (ref 37–47)
HGB BLD-MCNC: 12 G/DL (ref 12–16)
MCHC RBC AUTO-ENTMCNC: 31.4 G/DL (ref 33–37)
MCV RBC AUTO: 91.8 FL (ref 81–99)
NRBC BLD AUTO-RTO: 0 %
PLATELET # BLD AUTO: 505 10^3/UL (ref 130–400)
POTASSIUM SERPL-SCNC: 4.6 MMOL/L (ref 3.5–5.1)
PROT SERPL-MCNC: 7.1 G/DL (ref 6.3–8.2)
SODIUM SERPL-SCNC: 140 MMOL/L (ref 135–145)

## 2024-11-16 RX ADMIN — PRAVASTATIN SODIUM 40 MG: 40 TABLET ORAL at 21:34

## 2024-11-16 RX ADMIN — HEPARIN SODIUM 5000 UNITS: 5000 INJECTION, SOLUTION INTRAVENOUS; SUBCUTANEOUS at 20:20

## 2024-11-16 RX ADMIN — IPRATROPIUM BROMIDE AND ALBUTEROL SULFATE 3 ML: 2.5; .5 SOLUTION RESPIRATORY (INHALATION) at 14:11

## 2024-11-16 RX ADMIN — AZITHROMYCIN MONOHYDRATE 250: 500 INJECTION, POWDER, LYOPHILIZED, FOR SOLUTION INTRAVENOUS at 17:47

## 2024-11-16 RX ADMIN — LISINOPRIL 10 MG: 10 TABLET ORAL at 21:34

## 2024-11-16 RX ADMIN — CEFTRIAXONE 1000 MG: 1 INJECTION, POWDER, FOR SOLUTION INTRAMUSCULAR; INTRAVENOUS at 14:11

## 2024-11-16 RX ADMIN — GUAIFENESIN 1200 MG: 600 TABLET, EXTENDED RELEASE ORAL at 20:20

## 2024-11-17 VITALS — RESPIRATION RATE: 12 BRPM

## 2024-11-17 VITALS — DIASTOLIC BLOOD PRESSURE: 71 MMHG | SYSTOLIC BLOOD PRESSURE: 130 MMHG | RESPIRATION RATE: 22 BRPM

## 2024-11-17 VITALS — RESPIRATION RATE: 17 BRPM | DIASTOLIC BLOOD PRESSURE: 75 MMHG | SYSTOLIC BLOOD PRESSURE: 127 MMHG

## 2024-11-17 VITALS — SYSTOLIC BLOOD PRESSURE: 117 MMHG | DIASTOLIC BLOOD PRESSURE: 66 MMHG | RESPIRATION RATE: 20 BRPM

## 2024-11-17 LAB
ABSOLUTE NEUTROPHILS -MAN DIFF: 9.9 10^3/UL (ref 1.4–6.5)
ALBUMIN SERPL-MCNC: 3.6 G/DL (ref 3.5–5)
ALP SERPL-CCNC: 81 U/L (ref 38–126)
ALT SERPL-CCNC: 62 U/L (ref 0–35)
ANISOCYTOSIS BLD QL: (no result)
AST SERPL-CCNC: 36 U/L (ref 14–36)
BUN SERPL-MCNC: 19 MG/DL (ref 7–17)
CALCIUM SERPL-MCNC: 9.1 MG/DL (ref 8.4–10.2)
CHLORIDE SERPL-SCNC: 103 MMOL/L (ref 98–107)
CO2 SERPL-SCNC: 28 MMOL/L (ref 22–30)
EGFR: > 60
ERYTHROCYTE [DISTWIDTH] IN BLOOD BY AUTOMATED COUNT: 13.7 % (ref 11.5–14.5)
ESTIMATED CREATININE CLEARANCE: 91 ML/MIN
GLUCOSE SERPL-MCNC: 93 MG/DL (ref 70–99)
HCT VFR BLD AUTO: 35.8 % (ref 37–47)
HGB BLD-MCNC: 11 G/DL (ref 12–16)
MACROCYTES BLD QL: (no result)
MCHC RBC AUTO-ENTMCNC: 30.7 G/DL (ref 33–37)
MCV RBC AUTO: 93.2 FL (ref 81–99)
PLATELET # BLD AUTO: 478 10^3/UL (ref 130–400)
POTASSIUM SERPL-SCNC: 4.6 MMOL/L (ref 3.5–5.1)
PROT SERPL-MCNC: 6.5 G/DL (ref 6.3–8.2)
SODIUM SERPL-SCNC: 144 MMOL/L (ref 135–145)
TOTAL CELLS COUNTED BLD: 100

## 2024-11-17 RX ADMIN — MULTIVITAMIN TABLET 1 TABLET: TABLET at 09:02

## 2024-11-17 RX ADMIN — HEPARIN SODIUM 5000 UNITS: 5000 INJECTION, SOLUTION INTRAVENOUS; SUBCUTANEOUS at 09:01

## 2024-11-17 RX ADMIN — Medication 2 SPRAYS: at 18:18

## 2024-11-17 RX ADMIN — ALBUTEROL SULFATE 1.25 MG: 1.25 SOLUTION RESPIRATORY (INHALATION) at 12:17

## 2024-11-17 RX ADMIN — PANTOPRAZOLE SODIUM 40 MG: 40 TABLET, DELAYED RELEASE ORAL at 08:07

## 2024-11-17 RX ADMIN — ALBUTEROL SULFATE 2.5 MG: 2.5 SOLUTION RESPIRATORY (INHALATION) at 02:35

## 2024-11-17 RX ADMIN — LISINOPRIL 10 MG: 10 TABLET ORAL at 21:12

## 2024-11-17 RX ADMIN — WATER 10 ML: 1 INJECTION INTRAMUSCULAR; INTRAVENOUS; SUBCUTANEOUS at 14:50

## 2024-11-17 RX ADMIN — VENLAFAXINE HYDROCHLORIDE 150 MG: 150 CAPSULE, EXTENDED RELEASE ORAL at 09:01

## 2024-11-17 RX ADMIN — Medication 50 MCG: at 09:01

## 2024-11-17 RX ADMIN — FLUOXETINE 40 MG: 20 CAPSULE ORAL at 09:00

## 2024-11-17 RX ADMIN — Medication 2 SPRAYS: at 21:12

## 2024-11-17 RX ADMIN — HEPARIN SODIUM 5000 UNITS: 5000 INJECTION, SOLUTION INTRAVENOUS; SUBCUTANEOUS at 21:11

## 2024-11-17 RX ADMIN — PRAVASTATIN SODIUM 40 MG: 40 TABLET ORAL at 21:12

## 2024-11-17 RX ADMIN — AZITHROMYCIN MONOHYDRATE 250: 500 INJECTION, POWDER, LYOPHILIZED, FOR SOLUTION INTRAVENOUS at 18:18

## 2024-11-17 RX ADMIN — ALBUTEROL SULFATE 1.25 MG: 1.25 SOLUTION RESPIRATORY (INHALATION) at 19:58

## 2024-11-17 RX ADMIN — CEFTRIAXONE 1000 MG: 1 INJECTION, POWDER, FOR SOLUTION INTRAMUSCULAR; INTRAVENOUS at 14:50

## 2024-11-17 RX ADMIN — GUAIFENESIN 1200 MG: 600 TABLET, EXTENDED RELEASE ORAL at 21:00

## 2024-11-17 RX ADMIN — ALBUTEROL SULFATE 1.25 MG: 1.25 SOLUTION RESPIRATORY (INHALATION) at 15:44

## 2024-11-17 RX ADMIN — GUAIFENESIN 1200 MG: 600 TABLET, EXTENDED RELEASE ORAL at 09:00

## 2024-11-18 VITALS — DIASTOLIC BLOOD PRESSURE: 82 MMHG | RESPIRATION RATE: 18 BRPM | SYSTOLIC BLOOD PRESSURE: 162 MMHG

## 2024-11-18 VITALS — RESPIRATION RATE: 18 BRPM | DIASTOLIC BLOOD PRESSURE: 75 MMHG | SYSTOLIC BLOOD PRESSURE: 123 MMHG

## 2024-11-18 VITALS — RESPIRATION RATE: 16 BRPM

## 2024-11-18 VITALS — SYSTOLIC BLOOD PRESSURE: 141 MMHG | RESPIRATION RATE: 18 BRPM | DIASTOLIC BLOOD PRESSURE: 77 MMHG

## 2024-11-18 VITALS — RESPIRATION RATE: 18 BRPM

## 2024-11-18 LAB
ALBUMIN SERPL-MCNC: 3.5 G/DL (ref 3.5–5)
ALP SERPL-CCNC: 78 U/L (ref 38–126)
ALT SERPL-CCNC: 59 U/L (ref 0–35)
AST SERPL-CCNC: 32 U/L (ref 14–36)
BUN SERPL-MCNC: 17 MG/DL (ref 7–17)
CALCIUM SERPL-MCNC: 9 MG/DL (ref 8.4–10.2)
CHLORIDE SERPL-SCNC: 103 MMOL/L (ref 98–107)
CO2 SERPL-SCNC: 27 MMOL/L (ref 22–30)
EGFR: > 60
ERYTHROCYTE [DISTWIDTH] IN BLOOD BY AUTOMATED COUNT: 14 % (ref 11.5–14.5)
ESTIMATED CREATININE CLEARANCE: 91 ML/MIN
GLUCOSE SERPL-MCNC: 99 MG/DL (ref 70–99)
HCT VFR BLD AUTO: 35.9 % (ref 37–47)
HGB BLD-MCNC: 11.3 G/DL (ref 12–16)
MCHC RBC AUTO-ENTMCNC: 31.5 G/DL (ref 33–37)
MCV RBC AUTO: 93.7 FL (ref 81–99)
PLATELET # BLD AUTO: 483 10^3/UL (ref 130–400)
POTASSIUM SERPL-SCNC: 5.2 MMOL/L (ref 3.5–5.1)
PROT SERPL-MCNC: 6.3 G/DL (ref 6.3–8.2)
SODIUM SERPL-SCNC: 143 MMOL/L (ref 135–145)

## 2024-11-18 RX ADMIN — GUAIFENESIN 1200 MG: 600 TABLET, EXTENDED RELEASE ORAL at 20:22

## 2024-11-18 RX ADMIN — BUDESONIDE AND FORMOTEROL FUMARATE DIHYDRATE 2 PUFF: 160; 4.5 AEROSOL RESPIRATORY (INHALATION) at 20:05

## 2024-11-18 RX ADMIN — FLUOXETINE 40 MG: 20 CAPSULE ORAL at 08:48

## 2024-11-18 RX ADMIN — Medication 2 SPRAYS: at 17:27

## 2024-11-18 RX ADMIN — PREDNISONE 40 MG: 20 TABLET ORAL at 08:47

## 2024-11-18 RX ADMIN — LISINOPRIL 10 MG: 10 TABLET ORAL at 22:16

## 2024-11-18 RX ADMIN — WATER 10 ML: 1 INJECTION INTRAMUSCULAR; INTRAVENOUS; SUBCUTANEOUS at 13:42

## 2024-11-18 RX ADMIN — IPRATROPIUM BROMIDE AND ALBUTEROL SULFATE 3 ML: 2.5; .5 SOLUTION RESPIRATORY (INHALATION) at 08:26

## 2024-11-18 RX ADMIN — Medication 2 SPRAYS: at 13:39

## 2024-11-18 RX ADMIN — CEFTRIAXONE 1000 MG: 1 INJECTION, POWDER, FOR SOLUTION INTRAMUSCULAR; INTRAVENOUS at 13:41

## 2024-11-18 RX ADMIN — HEPARIN SODIUM 5000 UNITS: 5000 INJECTION, SOLUTION INTRAVENOUS; SUBCUTANEOUS at 20:22

## 2024-11-18 RX ADMIN — IPRATROPIUM BROMIDE AND ALBUTEROL SULFATE 3 ML: 2.5; .5 SOLUTION RESPIRATORY (INHALATION) at 11:33

## 2024-11-18 RX ADMIN — VENLAFAXINE HYDROCHLORIDE 150 MG: 150 CAPSULE, EXTENDED RELEASE ORAL at 08:49

## 2024-11-18 RX ADMIN — PRAVASTATIN SODIUM 40 MG: 40 TABLET ORAL at 22:12

## 2024-11-18 RX ADMIN — IPRATROPIUM BROMIDE AND ALBUTEROL SULFATE 3 ML: 2.5; .5 SOLUTION RESPIRATORY (INHALATION) at 20:05

## 2024-11-18 RX ADMIN — GUAIFENESIN 1200 MG: 600 TABLET, EXTENDED RELEASE ORAL at 08:46

## 2024-11-18 RX ADMIN — AZITHROMYCIN MONOHYDRATE 250: 500 INJECTION, POWDER, LYOPHILIZED, FOR SOLUTION INTRAVENOUS at 17:26

## 2024-11-18 RX ADMIN — Medication 50 MCG: at 08:47

## 2024-11-18 RX ADMIN — HEPARIN SODIUM 5000 UNITS: 5000 INJECTION, SOLUTION INTRAVENOUS; SUBCUTANEOUS at 08:50

## 2024-11-18 RX ADMIN — PANTOPRAZOLE SODIUM 40 MG: 40 TABLET, DELAYED RELEASE ORAL at 08:45

## 2024-11-18 RX ADMIN — Medication 2 SPRAYS: at 08:50

## 2024-11-18 RX ADMIN — MULTIVITAMIN TABLET 1 TABLET: TABLET at 08:49

## 2024-11-18 RX ADMIN — Medication 2 SPRAYS: at 22:12

## 2024-11-18 RX ADMIN — IPRATROPIUM BROMIDE AND ALBUTEROL SULFATE 3 ML: 2.5; .5 SOLUTION RESPIRATORY (INHALATION) at 15:32

## 2024-11-18 RX ADMIN — BUDESONIDE AND FORMOTEROL FUMARATE DIHYDRATE 2 PUFF: 160; 4.5 AEROSOL RESPIRATORY (INHALATION) at 08:26

## 2024-11-19 VITALS — DIASTOLIC BLOOD PRESSURE: 73 MMHG | SYSTOLIC BLOOD PRESSURE: 140 MMHG | RESPIRATION RATE: 18 BRPM

## 2024-11-19 VITALS — RESPIRATION RATE: 16 BRPM

## 2024-11-19 VITALS — SYSTOLIC BLOOD PRESSURE: 153 MMHG | DIASTOLIC BLOOD PRESSURE: 73 MMHG | RESPIRATION RATE: 18 BRPM

## 2024-11-19 VITALS — RESPIRATION RATE: 18 BRPM

## 2024-11-19 VITALS — RESPIRATION RATE: 20 BRPM | SYSTOLIC BLOOD PRESSURE: 136 MMHG | DIASTOLIC BLOOD PRESSURE: 91 MMHG

## 2024-11-19 VITALS — OXYGEN SATURATION: 86 %

## 2024-11-19 LAB
BUN SERPL-MCNC: 16 MG/DL (ref 7–17)
CALCIUM SERPL-MCNC: 9.5 MG/DL (ref 8.4–10.2)
CHLORIDE SERPL-SCNC: 102 MMOL/L (ref 98–107)
CO2 SERPL-SCNC: 28 MMOL/L (ref 22–30)
EGFR: > 60
ERYTHROCYTE [DISTWIDTH] IN BLOOD BY AUTOMATED COUNT: 14 % (ref 11.5–14.5)
ESTIMATED CREATININE CLEARANCE: 91 ML/MIN
GLUCOSE SERPL-MCNC: 94 MG/DL (ref 70–99)
HCT VFR BLD AUTO: 35.7 % (ref 37–47)
HGB BLD-MCNC: 11.2 G/DL (ref 12–16)
MCHC RBC AUTO-ENTMCNC: 31.4 G/DL (ref 33–37)
MCV RBC AUTO: 93 FL (ref 81–99)
PLATELET # BLD AUTO: 482 10^3/UL (ref 130–400)
POTASSIUM SERPL-SCNC: 5.4 MMOL/L (ref 3.5–5.1)
SODIUM SERPL-SCNC: 144 MMOL/L (ref 135–145)

## 2024-11-19 RX ADMIN — IPRATROPIUM BROMIDE AND ALBUTEROL SULFATE 3 ML: 2.5; .5 SOLUTION RESPIRATORY (INHALATION) at 20:22

## 2024-11-19 RX ADMIN — AZITHROMYCIN 500 MG: 250 TABLET, FILM COATED ORAL at 12:20

## 2024-11-19 RX ADMIN — PANTOPRAZOLE SODIUM 40 MG: 40 TABLET, DELAYED RELEASE ORAL at 08:40

## 2024-11-19 RX ADMIN — VENLAFAXINE HYDROCHLORIDE 150 MG: 150 CAPSULE, EXTENDED RELEASE ORAL at 08:39

## 2024-11-19 RX ADMIN — GUAIFENESIN 1200 MG: 600 TABLET, EXTENDED RELEASE ORAL at 21:01

## 2024-11-19 RX ADMIN — CEFTRIAXONE 1000 MG: 1 INJECTION, POWDER, FOR SOLUTION INTRAMUSCULAR; INTRAVENOUS at 13:02

## 2024-11-19 RX ADMIN — Medication 2 SPRAYS: at 12:21

## 2024-11-19 RX ADMIN — WATER 10 ML: 1 INJECTION INTRAMUSCULAR; INTRAVENOUS; SUBCUTANEOUS at 13:02

## 2024-11-19 RX ADMIN — GUAIFENESIN 1200 MG: 600 TABLET, EXTENDED RELEASE ORAL at 08:40

## 2024-11-19 RX ADMIN — PRAVASTATIN SODIUM 40 MG: 40 TABLET ORAL at 21:03

## 2024-11-19 RX ADMIN — HEPARIN SODIUM 5000 UNITS: 5000 INJECTION, SOLUTION INTRAVENOUS; SUBCUTANEOUS at 08:39

## 2024-11-19 RX ADMIN — FLUOXETINE 40 MG: 20 CAPSULE ORAL at 08:40

## 2024-11-19 RX ADMIN — Medication 2 SPRAYS: at 08:41

## 2024-11-19 RX ADMIN — Medication 2 SPRAYS: at 21:01

## 2024-11-19 RX ADMIN — IPRATROPIUM BROMIDE AND ALBUTEROL SULFATE 3 ML: 2.5; .5 SOLUTION RESPIRATORY (INHALATION) at 15:45

## 2024-11-19 RX ADMIN — BUDESONIDE AND FORMOTEROL FUMARATE DIHYDRATE 2 PUFF: 160; 4.5 AEROSOL RESPIRATORY (INHALATION) at 07:05

## 2024-11-19 RX ADMIN — Medication 50 MCG: at 08:41

## 2024-11-19 RX ADMIN — IPRATROPIUM BROMIDE AND ALBUTEROL SULFATE 3 ML: 2.5; .5 SOLUTION RESPIRATORY (INHALATION) at 11:49

## 2024-11-19 RX ADMIN — IPRATROPIUM BROMIDE AND ALBUTEROL SULFATE 3 ML: 2.5; .5 SOLUTION RESPIRATORY (INHALATION) at 07:05

## 2024-11-19 RX ADMIN — BUDESONIDE AND FORMOTEROL FUMARATE DIHYDRATE 2 PUFF: 160; 4.5 AEROSOL RESPIRATORY (INHALATION) at 20:22

## 2024-11-19 RX ADMIN — Medication 2 SPRAYS: at 17:18

## 2024-11-19 RX ADMIN — PREDNISONE 40 MG: 20 TABLET ORAL at 08:39

## 2024-11-19 RX ADMIN — HEPARIN SODIUM 5000 UNITS: 5000 INJECTION, SOLUTION INTRAVENOUS; SUBCUTANEOUS at 21:01

## 2024-11-19 RX ADMIN — MULTIVITAMIN TABLET 1 TABLET: TABLET at 08:41

## 2024-11-20 VITALS — RESPIRATION RATE: 16 BRPM

## 2024-11-20 VITALS — RESPIRATION RATE: 20 BRPM | SYSTOLIC BLOOD PRESSURE: 153 MMHG | DIASTOLIC BLOOD PRESSURE: 83 MMHG

## 2024-11-20 LAB
BUN SERPL-MCNC: 18 MG/DL (ref 7–17)
CALCIUM SERPL-MCNC: 9.2 MG/DL (ref 8.4–10.2)
CHLORIDE SERPL-SCNC: 103 MMOL/L (ref 98–107)
CO2 SERPL-SCNC: 25 MMOL/L (ref 22–30)
EGFR: > 60
ERYTHROCYTE [DISTWIDTH] IN BLOOD BY AUTOMATED COUNT: 14.3 % (ref 11.5–14.5)
ESTIMATED CREATININE CLEARANCE: 91 ML/MIN
GLUCOSE SERPL-MCNC: 87 MG/DL (ref 70–99)
HCT VFR BLD AUTO: 34.9 % (ref 37–47)
HGB BLD-MCNC: 10.8 G/DL (ref 12–16)
MCHC RBC AUTO-ENTMCNC: 30.9 G/DL (ref 33–37)
MCV RBC AUTO: 93.3 FL (ref 81–99)
PLATELET # BLD AUTO: 492 10^3/UL (ref 130–400)
POTASSIUM SERPL-SCNC: 5 MMOL/L (ref 3.5–5.1)
SODIUM SERPL-SCNC: 142 MMOL/L (ref 135–145)

## 2024-11-20 RX ADMIN — IPRATROPIUM BROMIDE AND ALBUTEROL SULFATE 3 ML: 2.5; .5 SOLUTION RESPIRATORY (INHALATION) at 07:40

## 2024-11-20 RX ADMIN — FLUOXETINE 40 MG: 20 CAPSULE ORAL at 08:32

## 2024-11-20 RX ADMIN — AZITHROMYCIN 500 MG: 250 TABLET, FILM COATED ORAL at 08:34

## 2024-11-20 RX ADMIN — HEPARIN SODIUM 5000 UNITS: 5000 INJECTION, SOLUTION INTRAVENOUS; SUBCUTANEOUS at 08:35

## 2024-11-20 RX ADMIN — HYDROCHLOROTHIAZIDE 12.5 MG: 12.5 TABLET ORAL at 11:23

## 2024-11-20 RX ADMIN — BUDESONIDE AND FORMOTEROL FUMARATE DIHYDRATE 2 PUFF: 160; 4.5 AEROSOL RESPIRATORY (INHALATION) at 07:40

## 2024-11-20 RX ADMIN — PREDNISONE 40 MG: 20 TABLET ORAL at 08:33

## 2024-11-20 RX ADMIN — WATER 10 ML: 1 INJECTION INTRAMUSCULAR; INTRAVENOUS; SUBCUTANEOUS at 13:15

## 2024-11-20 RX ADMIN — VENLAFAXINE HYDROCHLORIDE 150 MG: 150 CAPSULE, EXTENDED RELEASE ORAL at 08:33

## 2024-11-20 RX ADMIN — Medication 2 SPRAYS: at 08:46

## 2024-11-20 RX ADMIN — Medication 50 MCG: at 08:31

## 2024-11-20 RX ADMIN — Medication 2 SPRAYS: at 13:17

## 2024-11-20 RX ADMIN — CEFTRIAXONE 1000 MG: 1 INJECTION, POWDER, FOR SOLUTION INTRAMUSCULAR; INTRAVENOUS at 13:16

## 2024-11-20 RX ADMIN — PANTOPRAZOLE SODIUM 40 MG: 40 TABLET, DELAYED RELEASE ORAL at 08:33

## 2024-11-20 RX ADMIN — SODIUM ZIRCONIUM CYCLOSILICATE 10 GRAM: 10 POWDER, FOR SUSPENSION ORAL at 11:23

## 2024-11-20 RX ADMIN — IPRATROPIUM BROMIDE AND ALBUTEROL SULFATE 3 ML: 2.5; .5 SOLUTION RESPIRATORY (INHALATION) at 11:43

## 2024-11-20 RX ADMIN — MULTIVITAMIN TABLET 1 TABLET: TABLET at 08:33

## 2024-11-20 RX ADMIN — GUAIFENESIN 1200 MG: 600 TABLET, EXTENDED RELEASE ORAL at 08:32

## 2025-01-09 ENCOUNTER — HOSPITAL ENCOUNTER (OUTPATIENT)
Dept: HOSPITAL 99 - RAD | Age: 65
End: 2025-01-09
Payer: COMMERCIAL

## 2025-01-09 DIAGNOSIS — J18.9: Primary | ICD-10-CM

## 2025-05-19 ENCOUNTER — HOSPITAL ENCOUNTER (OUTPATIENT)
Dept: HOSPITAL 99 - GI | Age: 65
Discharge: HOME | End: 2025-05-19
Payer: COMMERCIAL

## 2025-05-19 DIAGNOSIS — K21.00: ICD-10-CM

## 2025-05-19 DIAGNOSIS — D12.3: ICD-10-CM

## 2025-05-19 DIAGNOSIS — K63.5: ICD-10-CM

## 2025-05-19 DIAGNOSIS — Z86.0100: ICD-10-CM

## 2025-05-19 DIAGNOSIS — Z12.11: Primary | ICD-10-CM

## 2025-05-19 DIAGNOSIS — Q43.8: ICD-10-CM

## 2025-05-19 DIAGNOSIS — K22.89: ICD-10-CM

## 2025-05-19 DIAGNOSIS — K44.9: ICD-10-CM

## 2025-05-19 DIAGNOSIS — K31.89: ICD-10-CM

## 2025-05-19 DIAGNOSIS — Q39.9: ICD-10-CM

## 2025-05-19 PROCEDURE — 43239 EGD BIOPSY SINGLE/MULTIPLE: CPT

## 2025-05-19 PROCEDURE — 88305 TISSUE EXAM BY PATHOLOGIST: CPT

## 2025-05-19 PROCEDURE — 45385 COLONOSCOPY W/LESION REMOVAL: CPT
